# Patient Record
Sex: FEMALE | Race: WHITE | Employment: OTHER | URBAN - METROPOLITAN AREA
[De-identification: names, ages, dates, MRNs, and addresses within clinical notes are randomized per-mention and may not be internally consistent; named-entity substitution may affect disease eponyms.]

---

## 2017-01-23 RX ORDER — AMIODARONE HYDROCHLORIDE 200 MG/1
TABLET ORAL
Qty: 30 TAB | Refills: 6 | Status: SHIPPED | OUTPATIENT
Start: 2017-01-23 | End: 2017-04-04 | Stop reason: SDUPTHER

## 2017-04-04 ENCOUNTER — OFFICE VISIT (OUTPATIENT)
Dept: CARDIOLOGY CLINIC | Age: 82
End: 2017-04-04

## 2017-04-04 VITALS
HEART RATE: 64 BPM | DIASTOLIC BLOOD PRESSURE: 65 MMHG | BODY MASS INDEX: 24.48 KG/M2 | SYSTOLIC BLOOD PRESSURE: 111 MMHG | HEIGHT: 62 IN | WEIGHT: 133 LBS

## 2017-04-04 DIAGNOSIS — I48.0 PAROXYSMAL ATRIAL FIBRILLATION (HCC): Primary | ICD-10-CM

## 2017-04-04 DIAGNOSIS — E78.5 HYPERLIPIDEMIA, UNSPECIFIED HYPERLIPIDEMIA TYPE: ICD-10-CM

## 2017-04-04 DIAGNOSIS — R06.02 SOB (SHORTNESS OF BREATH) ON EXERTION: ICD-10-CM

## 2017-04-04 DIAGNOSIS — I10 ESSENTIAL HYPERTENSION, BENIGN: ICD-10-CM

## 2017-04-04 DIAGNOSIS — Z79.899 HIGH RISK MEDICATION USE: ICD-10-CM

## 2017-04-04 RX ORDER — DILTIAZEM HYDROCHLORIDE 120 MG/1
120 CAPSULE, COATED, EXTENDED RELEASE ORAL DAILY
Qty: 30 CAP | Refills: 6 | Status: SHIPPED | OUTPATIENT
Start: 2017-04-04 | End: 2017-06-01 | Stop reason: CLARIF

## 2017-04-04 RX ORDER — VALSARTAN AND HYDROCHLOROTHIAZIDE 160; 12.5 MG/1; MG/1
1 TABLET, FILM COATED ORAL DAILY
COMMUNITY
End: 2018-03-08

## 2017-04-04 NOTE — PROGRESS NOTES
1. Have you been to the ER, urgent care clinic since your last visit? Hospitalized since your last visit?     no  2. Have you seen or consulted any other health care providers outside of the [address] [place] [address] since your last visit? Include any pap smears or colon screening. Yes Where: Dr.Lopresti     3. Since your last visit, have you had any of the following symptoms? chest pains and shortness of breath. 4.  Have you had any blood work, X-rays or cardiac testing?       Yes Where: Dr. Jorge Moreno

## 2017-04-04 NOTE — TELEPHONE ENCOUNTER
Patient called to discuss changes to anticoagulants. Pharmacy called to order xarelto. She will  copay cards in the morning prior to going to the pharmacy. She voices understanding and acceptance of this advice and will call back if any further questions or concerns.

## 2017-04-04 NOTE — MR AVS SNAPSHOT
Visit Information Date & Time Provider Department Dept. Phone Encounter #  
 4/4/2017  9:00 AM Anyi Mott MD [facility] [phone] [id number] Follow-up Instructions Return in about 4 weeks (around 5/2/2017). Your Appointments 4/13/2017  9:15 AM  
PROCEDURE with [place] [facility] ([facility]) Appt Note: Echo/[address] [facility] [address]  
  
   
 [address] [address] [address] [address]  
  
    
 5/2/2017  9:45 AM  
Follow Up with Anyi Mott MD  
[facility] ([facility]) Appt Note: 4 week post echo/holter/PFT follow up  
 [address] [facility] [address]  
  
   
 [address] [id number] [address] [address] Upcoming Health Maintenance Date Due DTaP/Tdap/Td series (1 - Tdap) 11/23/1956 ZOSTER VACCINE AGE 60> 11/23/1995 GLAUCOMA SCREENING Q2Y 11/23/2000 OSTEOPOROSIS SCREENING (DEXA) 11/23/2000 Pneumococcal 65+ Low/Medium Risk (1 of 2 - PCV13) 11/23/2000 MEDICARE YEARLY EXAM 11/23/2000 INFLUENZA AGE 9 TO ADULT 8/1/2016 Allergies as of 4/4/2017  Review Complete On: 4/4/2017 By: Anyi Mott MD  
  
 Severity Noted Reaction Type Reactions Lisinopril  02/27/2013    Cough *Antihypertensives * Current Immunizations  Never Reviewed No immunizations on file. Not reviewed this visit You Were Diagnosed With   
  
 Codes Comments Paroxysmal atrial fibrillation (HCC)    -  Primary ICD-10-CM: I48.0 ICD-9-CM: 427.31 recent increase sob 
recurrent a fib 
stop amiodarone 
add eliquis and cardizem Essential hypertension, benign     ICD-10-CM: I10 
ICD-9-CM: 401.1 stable Hyperlipidemia, unspecified hyperlipidemia type     ICD-10-CM: E78.5 ICD-9-CM: 272.4 SOB (shortness of breath) on exertion     ICD-10-CM: R06.02 
ICD-9-CM: 786.05 recently worse 
exertional 
like climbing stairs High risk medication use     ICD-10-CM: Z79.899 ICD-9-CM: V58.69 amiodarone for af  
  
Vitals BP Pulse Height(growth percentile) Weight(growth percentile) BMI OB Status 111/65 64 5' 2\" (1.575 m) 133 lb (60.3 kg) 24.33 kg/m2 Hysterectomy Smoking Status Never Smoker Vitals History BMI and BSA Data Body Mass Index Body Surface Area  
 24.33 kg/m 2 1.62 m 2 Preferred Pharmacy Pharmacy Name Phone [id number] - [place] [address] [phone] Your Updated Medication List  
  
   
This list is accurate as of: 4/4/17  9:40 AM.  Always use your most recent med list.  
  
  
  
  
 ALEVE 220 mg tablet Generic drug:  naproxen sodium Take 220 mg by mouth two (2) times daily (with meals). ALLERGY RELIEF (LORATADINE) 10 mg dissolvable tablet Generic drug:  loratadine Take 10 mg by mouth daily. apixaban 5 mg tablet Commonly known as:  Elta Cheek Take 1 Tab by mouth two (2) times a day. aspirin delayed-release 81 mg tablet Take  by mouth daily. atorvastatin 40 mg tablet Commonly known as:  LIPITOR Take  by mouth daily. dilTIAZem  mg ER capsule Commonly known as:  CARDIZEM CD Take 1 Cap by mouth daily. valsartan-hydroCHLOROthiazide 160-12.5 mg per tablet Commonly known as:  DIOVAN-HCT Take 1 Tab by mouth daily. Prescriptions Sent to Pharmacy Refills  
 apixaban (ELIQUIS) 5 mg tablet 6 Sig: Take 1 Tab by mouth two (2) times a day. Class: Normal  
 Pharmacy: [address] [place] [address] Ph #: [phone] Route: Oral  
 dilTIAZem CD (CARDIZEM CD) 120 mg ER capsule 6 Sig: Take 1 Cap by mouth daily. Class: Normal  
 Pharmacy: [address] [place] [address] Ph #: [phone] Route: Oral  
  
We Performed the Following AMB POC EKG ROUTINE W/ 12 LEADS, INTER & REP [39522 CPT(R)] ECG MONITOR/24 HRS,COMPLETE [id number] CPT(R)] Follow-up Instructions Return in about 4 weeks (around 5/2/2017). To-Do List   
 04/07/2017 Cardiac Services:  2D ECHO COMPLETE ADULT (TTE)   
  
 04/11/2017 PFT:  PFT DLCO Patient Instructions Patient is scheduled to have a PFT at [place] [address] on 4/27/17 @ 9:00. Introducing [facility]! [employer] introduces [employer] patient portal. Now you can access parts of your medical record, email your doctor's office, and request medication refills online. 1. In your internet browser, go to [email or web address] 2. Click on the First Time User? Click Here link in the Sign In box. You will see the New Member Sign Up page. 3. Enter your Peak 10 Access Code exactly as it appears below. You will not need to use this code after youve completed the sign-up process. If you do not sign up before the expiration date, you must request a new code. · Peak [employer] Access Code: [email or web address] Expires: 7/3/2017  8:53 AM 
 
4. Enter the last four digits of your Social Security Number (xxxx) and Date of Birth (mm/dd/yyyy) as indicated and click Submit. You will be taken to the next sign-up page. 5. Create a Peak 10 ID. This will be your Peak 10 login ID and cannot be changed, so think of one that is secure and easy to remember. 6. Create a Peak 10 password. You can change your password at any time. 7. Enter your Password Reset Question and Answer. This can be used at a later time if you forget your password. 8. Enter your e-mail address. You will receive e-mail notification when new information is available in [address] 9. Click Sign Up. You can now view and download portions of your medical record. 10. Click the Download Summary menu link to download a portable copy of your medical information. If you have questions, please visit the Frequently Asked Questions section of the [employer] website. Remember, [employer] is NOT to be used for urgent needs. For medical emergencies, dial 911. Now available from your iPhone and Android! Please provide this summary of care documentation to your next provider. Your primary care clinician is listed as Ole Motley. If you have any questions after today's visit, please call [phone].

## 2017-04-04 NOTE — LETTER
Geovany Massed 
1935 4/4/2017 Dear Victor Manuel Curry MD 
 
I had the pleasure of evaluating  Ms. Filemon Haider in office today. Below are the relevant portions of my assessment and plan of care. ICD-10-CM ICD-9-CM 1. Paroxysmal atrial fibrillation (HCC) I48.0 427.31 2D ECHO COMPLETE ADULT (TTE) PFT DLCO AMB POC EKG ROUTINE W/ 12 LEADS, INTER & REP  
   ECG MONITOR/24 HRS,COMPLETE  
 recent increase sob 
recurrent a fib 
stop amiodarone 
add eliquis and cardizem 2. Essential hypertension, benign I10 401.1   
 stable 3. Hyperlipidemia, unspecified hyperlipidemia type E78.5 272.4 4. SOB (shortness of breath) on exertion R06.02 786.05   
 recently worse 
exertional 
like climbing stairs 5. High risk medication use Z79.899 V58.69 2D ECHO COMPLETE ADULT (TTE) PFT DLCO  
 amiodarone for af  
 
 
Current Outpatient Prescriptions Medication Sig Dispense Refill  valsartan-hydroCHLOROthiazide (DIOVAN-HCT) 160-12.5 mg per tablet Take 1 Tab by mouth daily.  apixaban (ELIQUIS) 5 mg tablet Take 1 Tab by mouth two (2) times a day. 60 Tab 6  
 dilTIAZem CD (CARDIZEM CD) 120 mg ER capsule Take 1 Cap by mouth daily. 30 Cap 6  
 atorvastatin (LIPITOR) 40 mg tablet Take  by mouth daily.  naproxen sodium (ALEVE) 220 mg tablet Take 220 mg by mouth two (2) times daily (with meals).  aspirin delayed-release 81 mg tablet Take  by mouth daily.  loratadine (ALLERGY RELIEF, LORATADINE,) 10 mg dissolvable tablet Take 10 mg by mouth daily. Orders Placed This Encounter  2D ECHO COMPLETE ADULT (TTE) Standing Status:   Future Standing Expiration Date:   10/1/2017 Order Specific Question:   Reason for Exam: Answer:   see diagnosis  ECG MONITOR/24 HRS,COMPLETE Order Specific Question:   Reason for Exam: Answer:   a fib  AMB POC EKG ROUTINE W/ 12 LEADS, INTER & REP Order Specific Question:   Reason for Exam: Answer:   afib  PFT DLCO  
 Standing Status:   Future Standing Expiration Date:   10/2/2017  
 valsartan-hydroCHLOROthiazide (DIOVAN-HCT) 160-12.5 mg per tablet Sig: Take 1 Tab by mouth daily.  apixaban (ELIQUIS) 5 mg tablet Sig: Take 1 Tab by mouth two (2) times a day. Dispense:  60 Tab Refill:  6  
 dilTIAZem CD (CARDIZEM CD) 120 mg ER capsule Sig: Take 1 Cap by mouth daily. Dispense:  30 Cap Refill:  6 If you have questions, please do not hesitate to call me. I look forward to following Ms. Sarah Friend along with you. Sincerely, Junie Fonseca MD

## 2017-04-04 NOTE — PROGRESS NOTES
HISTORY OF PRESENT ILLNESS  Taty Jc is a 80 y.o. female. HPI Comments: Patient with a fib,htn,on amiodarone. On follow up patient denies any chest pains,sob, palpitation or other significant symptoms. Irregular Heart Beat    The history is provided by the patient. This is a chronic problem. The problem has been resolved. The problem occurs rarely. Associated symptoms include shortness of breath. Pertinent negatives include no fever, no chest pain, no claudication, no orthopnea, no PND, no abdominal pain, no nausea, no vomiting, no headaches, no dizziness, no weakness, no cough, no hemoptysis and no sputum production. Shortness of Breath   Pertinent negatives include no fever, no headaches, no cough, no sputum production, no hemoptysis, no wheezing, no PND, no orthopnea, no chest pain, no vomiting, no abdominal pain, no rash, no leg swelling and no claudication. Review of Systems   Constitutional: Negative for chills and fever. HENT: Negative for nosebleeds. Eyes: Negative for blurred vision and double vision. Respiratory: Positive for shortness of breath. Negative for cough, hemoptysis, sputum production and wheezing. Cardiovascular: Positive for palpitations. Negative for chest pain, orthopnea, claudication, leg swelling and PND. Gastrointestinal: Negative for abdominal pain, heartburn, nausea and vomiting. Musculoskeletal: Negative for myalgias. Skin: Negative for rash. Neurological: Negative for dizziness, weakness and headaches. Endo/Heme/Allergies: Does not bruise/bleed easily.      Family History   Problem Relation Age of Onset    Heart Disease Neg Hx      no family history of heart disease       Past Medical History:   Diagnosis Date    Atrial fibrillation [facility])     Assessment: Maintaining S Sheng on PO amiodarone pft followed by dr Josephine Chan. tsh/lft: mildly increased tsh, t4 normal, pf stable, rpt labs pending with pmd    CAD (coronary artery disease)     High cholesterol, a- fib    Chronic airway obstruction, not elsewhere classified 3/21/2013    Chronic lung disease     Essential hypertension, benign     Stable, edema better after receiving hctz    Hypertension     Other and unspecified hyperlipidemia     On Crestor and Fish Oil       Past Surgical History:   Procedure Laterality Date    HX HYSTERECTOMY      HX SPLENECTOMY         Social History   Substance Use Topics    Smoking status: Never Smoker    Smokeless tobacco: Never Used    Alcohol use Yes      Comment: socially       Allergies   Allergen Reactions    Lisinopril Cough     *Antihypertensives *       Outpatient Prescriptions Marked as Taking for the 4/4/17 encounter (Office Visit) with Hardy Bhandari MD   Medication Sig Dispense Refill    valsartan-hydroCHLOROthiazide (DIOVAN-HCT) 160-12.5 mg per tablet Take 1 Tab by mouth daily.  apixaban (ELIQUIS) 5 mg tablet Take 1 Tab by mouth two (2) times a day. 60 Tab 6    dilTIAZem CD (CARDIZEM CD) 120 mg ER capsule Take 1 Cap by mouth daily. 30 Cap 6    atorvastatin (LIPITOR) 40 mg tablet Take  by mouth daily.  naproxen sodium (ALEVE) 220 mg tablet Take 220 mg by mouth two (2) times daily (with meals).  aspirin delayed-release 81 mg tablet Take  by mouth daily.  loratadine (ALLERGY RELIEF, LORATADINE,) 10 mg dissolvable tablet Take 10 mg by mouth daily. Visit Vitals    /65    Pulse 64    Ht 5' 2\" (1.575 m)    Wt 60.3 kg (133 lb)    BMI 24.33 kg/m2       Physical Exam   Constitutional: She is oriented to person, place, and time. She appears well-developed and well-nourished. HENT:   Head: Normocephalic and atraumatic. Eyes: Conjunctivae are normal.   Neck: Neck supple. No JVD present. No tracheal deviation present. No thyromegaly present. Cardiovascular: Normal rate. An irregularly irregular rhythm present. PMI is not displaced. Exam reveals no gallop, no S3 and no decreased pulses.     No murmur heard.  Pulmonary/Chest: No respiratory distress. She has no wheezes. She has no rales. She exhibits no tenderness. Abdominal: Soft. There is no tenderness. Musculoskeletal: She exhibits no edema. Neurological: She is alert and oriented to person, place, and time. Skin: Skin is warm. Psychiatric: She has a normal mood and affect. Ms. Rocio Washburn has a reminder for a \"due or due soon\" health maintenance. I have asked that she contact her primary care provider for follow-up on this health maintenance. I Have personally reviewed recent relevant labs available and discussed with patient  3/2016-tsh -high-repeat free t4  lft-normal  CARDIOLOGY STUDIES 7/1/2012 9/1/2010 10/1/2009 4/1/2009 1/1/2009 12/1/2008   Myocardial Perfusion Scan Result - - - - nl scan, EF 70% -   Echocardiogram - Complete Result - - - - - EF 70%, PAP 30   Cardioversion Result - - - successful - -   PFT's with DLCO Result dlco 73, stable see report - - - -   24 hr Holter Monitor Result - - see report - - -     I have discussed risk benefit and option of use of amiodarone for arrythmia. Risk of toxicity with medication are informed. Patient will require careful monitoring.  ekg  A fib-4/2017    Assessment         ICD-10-CM ICD-9-CM    1. Paroxysmal atrial fibrillation (HCC) I48.0 427.31 2D ECHO COMPLETE ADULT (TTE)      PFT DLCO      AMB POC EKG ROUTINE W/ 12 LEADS, INTER & REP      ECG MONITOR/24 HRS,COMPLETE    recent increase sob  recurrent a fib  stop amiodarone  add eliquis and cardizem   2. Essential hypertension, benign I10 401.1     stable   3. Hyperlipidemia, unspecified hyperlipidemia type E78.5 272.4    4. SOB (shortness of breath) on exertion R06.02 786.05     recently worse  exertional  like climbing stairs   5.  High risk medication use Z79.899 V58.69 2D ECHO COMPLETE ADULT (TTE)      PFT DLCO    amiodarone for af     4/2017-recurrent-amiodarone stopped-has tried multiple other meds in past  Medications Discontinued During This Encounter   Medication Reason    amiodarone (CORDARONE) 214 mg tablet Duplicate Order    tiotropium (SPIRIVA WITH HANDIHALER) 18 mcg inhalation capsule Not A Current Medication    VALSARTAN PO Not A Current Medication    amiodarone (CORDARONE) 200 mg tablet Other       Orders Placed This Encounter    2D ECHO COMPLETE ADULT (TTE)     Standing Status:   Future     Standing Expiration Date:   10/1/2017     Order Specific Question:   Reason for Exam:     Answer:   see diagnosis    ECG MONITOR/24 HRS,COMPLETE     Order Specific Question:   Reason for Exam:     Answer:   a fib    AMB POC EKG ROUTINE W/ 12 LEADS, INTER & REP     Order Specific Question:   Reason for Exam:     Answer:   afib    PFT DLCO     Standing Status:   Future     Standing Expiration Date:   10/2/2017    apixaban (ELIQUIS) 5 mg tablet     Sig: Take 1 Tab by mouth two (2) times a day. Dispense:  60 Tab     Refill:  6    dilTIAZem CD (CARDIZEM CD) 120 mg ER capsule     Sig: Take 1 Cap by mouth daily. Dispense:  30 Cap     Refill:  6       Follow-up Disposition:  Return in about 4 weeks (around 5/2/2017).

## 2017-04-05 ENCOUNTER — TELEPHONE (OUTPATIENT)
Dept: CARDIOLOGY CLINIC | Age: 82
End: 2017-04-05

## 2017-04-05 NOTE — TELEPHONE ENCOUNTER
Patient was just put on Xarelto yesterday. Per Dr Alejo Santana. Should she take the Xarelto and ASA?

## 2017-04-05 NOTE — TELEPHONE ENCOUNTER
Patient was out on Xarelto and patient wanted to know if you still would like for her to take her ASA 81 mg daily along with the Xarelto. Please Advise.

## 2017-04-05 NOTE — TELEPHONE ENCOUNTER
Patient called to discuss discontinuing ASA while on Xarelto. She voices understanding and acceptance of this advice and will call back if any further questions or concerns.

## 2017-04-13 ENCOUNTER — CLINICAL SUPPORT (OUTPATIENT)
Dept: CARDIOLOGY CLINIC | Age: 82
End: 2017-04-13

## 2017-04-13 DIAGNOSIS — Z79.899 HIGH RISK MEDICATION USE: ICD-10-CM

## 2017-04-13 DIAGNOSIS — I48.0 PAROXYSMAL ATRIAL FIBRILLATION (HCC): ICD-10-CM

## 2017-05-02 ENCOUNTER — OFFICE VISIT (OUTPATIENT)
Dept: CARDIOLOGY CLINIC | Age: 82
End: 2017-05-02

## 2017-05-02 VITALS
HEIGHT: 62 IN | DIASTOLIC BLOOD PRESSURE: 53 MMHG | SYSTOLIC BLOOD PRESSURE: 117 MMHG | HEART RATE: 57 BPM | WEIGHT: 129 LBS | BODY MASS INDEX: 23.74 KG/M2

## 2017-05-02 DIAGNOSIS — I48.19 PERSISTENT ATRIAL FIBRILLATION (HCC): Primary | ICD-10-CM

## 2017-05-02 DIAGNOSIS — J98.4 CHRONIC LUNG DISEASE: ICD-10-CM

## 2017-05-02 DIAGNOSIS — I08.0 MITRAL AND AORTIC REGURGITATION: ICD-10-CM

## 2017-05-02 DIAGNOSIS — I10 ESSENTIAL HYPERTENSION, BENIGN: ICD-10-CM

## 2017-05-02 NOTE — PROGRESS NOTES
1. Have you been to the ER, urgent care clinic since your last visit? Hospitalized since your last visit?     no  2. Have you seen or consulted any other health care providers outside of the [facility] since your last visit? Include any pap smears or colon screening. No     3. Since your last visit, have you had any of the following symptoms?  no

## 2017-05-02 NOTE — LETTER
Desire Ink 
1935 5/2/2017 Dear Dary Wolf MD 
 
I had the pleasure of evaluating  Ms. Sharon Kidd in office today. Below are the relevant portions of my assessment and plan of care. ICD-10-CM ICD-9-CM 1. Persistent atrial fibrillation (HCC) I48.1 427.31   
 off amiodarone 
will continue anticoag 2. Essential hypertension, benign I10 401.1   
 stable 3. Mitral and aortic regurgitation I08.0 396.3   
 mild asymptomatic 4. Chronic lung disease J98.4 518.89   
 abn pft 
reported stable from 2012 Current Outpatient Prescriptions Medication Sig Dispense Refill  rivaroxaban (XARELTO) 20 mg tab tablet Take 1 Tab by mouth daily. 30 Tab 5  
 valsartan-hydroCHLOROthiazide (DIOVAN-HCT) 160-12.5 mg per tablet Take 1 Tab by mouth daily.  dilTIAZem CD (CARDIZEM CD) 120 mg ER capsule Take 1 Cap by mouth daily. 30 Cap 6  
 atorvastatin (LIPITOR) 40 mg tablet Take  by mouth daily.  naproxen sodium (ALEVE) 220 mg tablet Take 220 mg by mouth two (2) times daily (with meals).  loratadine (ALLERGY RELIEF, LORATADINE,) 10 mg dissolvable tablet Take 10 mg by mouth daily. No orders of the defined types were placed in this encounter. If you have questions, please do not hesitate to call me. I look forward to following Ms. Sharon Kidd along with you. Sincerely, Catalino Castellanos MD

## 2017-05-02 NOTE — MR AVS SNAPSHOT
Visit Information Date & Time Provider Department Dept. Phone Encounter #  
 5/2/2017  9:45 AM Anyi Mott MD [phone] Follow-up Instructions Return in about 6 months (around 11/2/2017). Upcoming Health Maintenance Date Due DTaP/Tdap/Td series (1 - Tdap) 11/23/1956 ZOSTER VACCINE AGE 60> 11/23/1995 GLAUCOMA SCREENING Q2Y 11/23/2000 OSTEOPOROSIS SCREENING (DEXA) 11/23/2000 Pneumococcal 65+ Low/Medium Risk (1 of 2 - PCV13) 11/23/2000 MEDICARE YEARLY EXAM 11/23/2000 INFLUENZA AGE 9 TO ADULT 8/1/2017 Allergies as of 5/2/2017  Review Complete On: 5/2/2017 By: Anyi Mott MD  
  
 Severity Noted Reaction Type Reactions Lisinopril  02/27/2013    Cough *Antihypertensives * Current Immunizations  Never Reviewed No immunizations on file. Not reviewed this visit You Were Diagnosed With   
  
 Codes Comments Persistent atrial fibrillation (HCC)    -  Primary ICD-10-CM: I48.1 ICD-9-CM: 427.31 off amiodarone 
will continue anticoag Essential hypertension, benign     ICD-10-CM: I10 
ICD-9-CM: 401.1 stable Mitral and aortic regurgitation     ICD-10-CM: I08.0 ICD-9-CM: 396.3 mild asymptomatic Chronic lung disease     ICD-10-CM: J98.4 ICD-9-CM: 518.89 abn pft 
reported stable from 2012 Vitals BP Pulse Height(growth percentile) Weight(growth percentile) BMI OB Status 117/53 (!) 57 5' 2\" (1.575 m) 129 lb (58.5 kg) 23.59 kg/m2 Hysterectomy Smoking Status Never Smoker Vitals History BMI and BSA Data Body Mass Index Body Surface Area  
 23.59 kg/m 2 1.6 m 2 Preferred Pharmacy Pharmacy Name Phone [id number] - [place] [address] [phone] Your Updated Medication List  
  
   
This list is accurate as of: 5/2/17 10:04 AM.  Always use your most recent med list.  
  
  
  
  
 ALEVE 220 mg tablet Generic drug:  naproxen sodium Take 220 mg by mouth two (2) times daily (with meals). ALLERGY RELIEF (LORATADINE) 10 mg dissolvable tablet Generic drug:  loratadine Take 10 mg by mouth daily. atorvastatin 40 mg tablet Commonly known as:  LIPITOR Take  by mouth daily. dilTIAZem  mg ER capsule Commonly known as:  CARDIZEM CD Take 1 Cap by mouth daily. rivaroxaban 20 mg Tab tablet Commonly known as:  Lendel Hurst Take 1 Tab by mouth daily. valsartan-hydroCHLOROthiazide 160-12.5 mg per tablet Commonly known as:  DIOVAN-HCT Take 1 Tab by mouth daily. Follow-up Instructions Return in about 6 months (around 11/2/2017). Introducing [address]! [facility] introduces [employer] patient portal. Now you can access parts of your medical record, email your doctor's office, and request medication refills online. 1. In your internet browser, go to [email or web address] 2. Click on the First Time User? Click Here link in the Sign In box. You will see the New Member Sign Up page. 3. Enter your [employer] Access Code exactly as it appears below. You will not need to use this code after youve completed the sign-up process. If you do not sign up before the expiration date, you must request a new code. · [employer] Access Code: [email or web address] Expires: 7/3/2017  8:53 AM 
 
4. Enter the last four digits of your Social Security Number (xxxx) and Date of Birth (mm/dd/yyyy) as indicated and click Submit. You will be taken to the next sign-up page. 5. Create a [employer] ID. This will be your [employer] login ID and cannot be changed, so think of one that is secure and easy to remember. 6. Create a [employer] password. You can change your password at any time. 7. Enter your Password Reset Question and Answer. This can be used at a later time if you forget your password. 8. Enter your e-mail address. You will receive e-mail notification when new information is available in [address] 9. Click Sign Up. You can now view and download portions of your medical record. 10. Click the Download Summary menu link to download a portable copy of your medical information. If you have questions, please visit the Frequently Asked Questions section of the [employer] website. Remember, [employer] is NOT to be used for urgent needs. For medical emergencies, dial 911. Now available from your iPhone and Android! Please provide this summary of care documentation to your next provider. Your primary care clinician is listed as Mak Stafford. If you have any questions after today's visit, please call [phone].

## 2017-05-02 NOTE — PROGRESS NOTES
HISTORY OF PRESENT ILLNESS  Geovany Perez is a [age] [address] y.o. female. HPI Comments: Patient with a fib,htn,on amiodarone. On follow up patient denies any chest pains,sob, palpitation or other significant symptoms. Patient is here for follow up of diagnostic tests. Results will be discussed. Palpitations    The history is provided by the patient. This is a chronic problem. The problem has been resolved. The problem occurs rarely. Associated symptoms include shortness of breath. Pertinent negatives include no fever, no chest pain, no claudication, no orthopnea, no PND, no abdominal pain, no nausea, no vomiting, no headaches, no dizziness, no weakness, no cough, no hemoptysis and no sputum production. Shortness of Breath   Pertinent negatives include no fever, no headaches, no cough, no sputum production, no hemoptysis, no wheezing, no PND, no orthopnea, no chest pain, no vomiting, no abdominal pain, no rash, no leg swelling and no claudication. Review of Systems   Constitutional: Negative for chills and fever. HENT: Negative for nosebleeds. Eyes: Negative for blurred vision and double vision. Respiratory: Positive for shortness of breath. Negative for cough, hemoptysis, sputum production and wheezing. Cardiovascular: Positive for palpitations. Negative for chest pain, orthopnea, claudication, leg swelling and PND. Gastrointestinal: Negative for abdominal pain, heartburn, nausea and vomiting. Musculoskeletal: Negative for myalgias. Skin: Negative for rash. Neurological: Negative for dizziness, weakness and headaches. Endo/Heme/Allergies: Does not bruise/bleed easily.      Family History   Problem Relation Age of Onset    Heart Disease Neg Hx      no family history of heart disease       Past Medical History:   Diagnosis Date    Atrial fibrillation [facility])     Assessment: Maintaining S Sheng on PO amiodarone pft followed by dr Vy Gabriel. tsh/lft: mildly increased tsh, t4 normal, pf stable, rpt labs pending with pmd    CAD (coronary artery disease)     High cholesterol, a- fib    Chronic airway obstruction, not elsewhere classified 3/21/2013    Chronic lung disease     Essential hypertension, benign     Stable, edema better after receiving hctz    Hypertension     Other and unspecified hyperlipidemia     On Crestor and Fish Oil       Past Surgical History:   Procedure Laterality Date    HX HYSTERECTOMY      HX SPLENECTOMY         Social History   Substance Use Topics    Smoking status: Never Smoker    Smokeless tobacco: Never Used    Alcohol use Yes      Comment: socially       Allergies   Allergen Reactions    Lisinopril Cough     *Antihypertensives *       Outpatient Prescriptions Marked as Taking for the 5/2/17 encounter (Office Visit) with Von Ingram MD   Medication Sig Dispense Refill    rivaroxaban (XARELTO) 20 mg tab tablet Take 1 Tab by mouth daily. 30 Tab 5    valsartan-hydroCHLOROthiazide (DIOVAN-HCT) 160-12.5 mg per tablet Take 1 Tab by mouth daily.  dilTIAZem CD (CARDIZEM CD) 120 mg ER capsule Take 1 Cap by mouth daily. 30 Cap 6    atorvastatin (LIPITOR) 40 mg tablet Take  by mouth daily.  naproxen sodium (ALEVE) 220 mg tablet Take 220 mg by mouth two (2) times daily (with meals).  loratadine (ALLERGY RELIEF, LORATADINE,) 10 mg dissolvable tablet Take 10 mg by mouth daily. Visit Vitals    /53    Pulse (!) 57    Ht 5' 2\" (1.575 m)    Wt 58.5 kg (129 lb)    BMI 23.59 kg/m2       Physical Exam   Constitutional: She is oriented to person, place, and time. She appears well-developed and well-nourished. HENT:   Head: Normocephalic and atraumatic. Eyes: Conjunctivae are normal.   Neck: Neck supple. No JVD present. No tracheal deviation present. No thyromegaly present. Cardiovascular: Normal rate. An irregularly irregular rhythm present. PMI is not displaced. Exam reveals no gallop, no S3 and no decreased pulses.     No murmur heard.  Pulmonary/Chest: No respiratory distress. She has no wheezes. She has no rales. She exhibits no tenderness. Abdominal: Soft. There is no tenderness. Musculoskeletal: She exhibits no edema. Neurological: She is alert and oriented to person, place, and time. Skin: Skin is warm. Psychiatric: She has a normal mood and affect. Ms. Lynnette Pastrana has a reminder for a \"due or due soon\" health maintenance. I have asked that she contact her primary care provider for follow-up on this health maintenance. I Have personally reviewed recent relevant labs available and discussed with patient  3/2016-tsh -high-repeat free t4  lft-normal  CARDIOLOGY STUDIES 7/1/2012 9/1/2010 10/1/2009 4/1/2009 1/1/2009 12/1/2008   Myocardial Perfusion Scan Result - - - - nl scan, EF 70% -   Echocardiogram - Complete Result - - - - - EF 70%, PAP 30   Cardioversion Result - - - successful - -   PFT's with DLCO Result dlco 73, stable see report - - - -   24 hr Holter Monitor Result - - see report - - -     I have discussed risk benefit and option of use of amiodarone for arrythmia. Risk of toxicity with medication are informed. Patient will require careful monitoring.  ekg  A fib-4/2017  SUMMARY:4/2017  Left ventricle: Systolic function was normal. Ejection fraction was  estimated to be 55 %. There were no regional wall motion abnormalities. Features were consistent with a pseudonormal left ventricular filling  pattern, with concomitant abnormal relaxation and increased filling  pressure (grade 2 diastolic dysfunction). Left atrium: The atrium was mildly dilated. Right atrium: The atrium was dilated. Mitral valve: There was mild annular calcification. There was mild  regurgitation. Aortic valve: There was mild regurgitation. 4/2017  holter-a fib    4/2017  pft-minimal obstructive and mild diffusion defect-no change 2012  Assessment         ICD-10-CM ICD-9-CM    1.  Persistent atrial fibrillation (HCC) I48.1 427.31     off amiodarone  will continue anticoag   2. Essential hypertension, benign I10 401.1     stable     3. Mitral and aortic regurgitation I08.0 396.3     mild asymptomatic   4. Chronic lung disease J98.4 518.89     abn pft  reported stable from 2012 4/2017-recurrent-amiodarone stopped-has tried multiple other meds in past  5/2017-discussed options of ablation -wants to continue rate control startegy  There are no discontinued medications. No orders of the defined types were placed in this encounter. Follow-up Disposition:  Return in about 6 months (around 11/2/2017).

## 2017-05-09 DIAGNOSIS — I48.0 PAROXYSMAL ATRIAL FIBRILLATION (HCC): ICD-10-CM

## 2017-05-09 DIAGNOSIS — Z79.899 HIGH RISK MEDICATION USE: ICD-10-CM

## 2017-06-01 DIAGNOSIS — I10 HYPERTENSION, ESSENTIAL: Primary | ICD-10-CM

## 2017-06-01 RX ORDER — AMLODIPINE BESYLATE 5 MG/1
5 TABLET ORAL DAILY
Qty: 30 TAB | Refills: 3 | Status: SHIPPED | OUTPATIENT
Start: 2017-06-01 | End: 2017-09-23 | Stop reason: SDUPTHER

## 2017-06-01 NOTE — TELEPHONE ENCOUNTER
Patient called to discuss medication changes d/t insurance recommendations. Patient will begin taking norvasc 5mg and discontinue taking cartia. She will wait for the tier exception results before making any decision concerning xarelto. She voices understanding and acceptance of this advice and will call back if any further questions or concerns.

## 2017-06-01 NOTE — TELEPHONE ENCOUNTER
As per patients request and [employer] recommendation stop diltiazem and start norvasc 5 mg a day  When ready chage xarelto to coumadin  Cost is controlled by [employer] so we can not change that

## 2017-06-01 NOTE — TELEPHONE ENCOUNTER
Patient calling to discuss Georgia Maryann that is causing dizziness. She has called [facility] and they have recommended Norvasc or Felodipine. She stated that she wanted one of these medications prescribed instead of the cartia. She was asked if she had taken Bp or HR daily since Bp meds had been prescribed, her answer was no, that she did not have a meter, and was not interested in recording her Bp, she just wanted her medication changed. She also had a second complaint. Her xarelto was $47.00 copay, and she had also asked [facility] to intervene, but wanted another alternative. She was informed that coumadin would be the more cost effective alternative and she stated that she would think about it for a while after testing and change in diet was discussed. Please advise. Thank You.

## 2017-08-11 ENCOUNTER — OFFICE VISIT (OUTPATIENT)
Dept: CARDIOLOGY CLINIC | Age: 82
End: 2017-08-11

## 2017-08-11 VITALS
BODY MASS INDEX: 23.92 KG/M2 | DIASTOLIC BLOOD PRESSURE: 69 MMHG | WEIGHT: 130 LBS | HEART RATE: 70 BPM | HEIGHT: 62 IN | SYSTOLIC BLOOD PRESSURE: 130 MMHG

## 2017-08-11 DIAGNOSIS — I10 ESSENTIAL HYPERTENSION, BENIGN: ICD-10-CM

## 2017-08-11 DIAGNOSIS — I48.19 PERSISTENT ATRIAL FIBRILLATION (HCC): ICD-10-CM

## 2017-08-11 DIAGNOSIS — I50.31 ACUTE DIASTOLIC CONGESTIVE HEART FAILURE (HCC): Primary | ICD-10-CM

## 2017-08-11 DIAGNOSIS — E78.5 HYPERLIPIDEMIA, UNSPECIFIED HYPERLIPIDEMIA TYPE: ICD-10-CM

## 2017-08-11 DIAGNOSIS — I08.0 MITRAL AND AORTIC REGURGITATION: ICD-10-CM

## 2017-08-11 RX ORDER — FUROSEMIDE 20 MG/1
TABLET ORAL AS NEEDED
COMMUNITY
End: 2018-02-19 | Stop reason: SDUPTHER

## 2017-08-11 NOTE — PROGRESS NOTES
HISTORY OF PRESENT ILLNESS  Angela Taveras is a 80 y.o. female. HPI Comments: Patient with a fib,htn,on amiodarone. On follow up patient denies any chest pains,sob, palpitation or other significant symptoms. Patient is here for follow up of diagnostic tests. Results will be discussed. Leg Swelling   The history is provided by the patient. This is a new problem. The current episode started more than 1 week ago (4-5/17). The problem occurs daily. The problem has been gradually improving (since got diuretics from urgent care). Associated symptoms include shortness of breath. Pertinent negatives include no chest pain, no abdominal pain and no headaches. The symptoms are aggravated by standing. The symptoms are relieved by medications and sleep. Shortness of Breath   The history is provided by the patient. This is a new problem. The problem occurs intermittently. The current episode started more than 1 week ago. The problem has been resolved. Pertinent negatives include no fever, no headaches, no cough, no sputum production, no hemoptysis, no wheezing, no PND, no orthopnea, no chest pain, no vomiting, no abdominal pain, no rash, no leg swelling and no claudication. The problem's precipitants include exercise (steps). Hospital Follow Up   The history is provided by the patient (post urgent care for edema). Associated symptoms include shortness of breath. Pertinent negatives include no chest pain, no abdominal pain and no headaches. Palpitations    The history is provided by the patient. This is a chronic (AFib) problem. The problem occurs rarely. Associated symptoms include lower extremity edema and shortness of breath. Pertinent negatives include no fever, no chest pain, no claudication, no orthopnea, no PND, no abdominal pain, no nausea, no vomiting, no headaches, no dizziness, no weakness, no cough, no hemoptysis and no sputum production.        Review of Systems   Constitutional: Negative for chills and fever.   HENT: Negative for nosebleeds. Eyes: Negative for blurred vision and double vision. Respiratory: Positive for shortness of breath. Negative for cough, hemoptysis, sputum production and wheezing. Cardiovascular: Positive for palpitations. Negative for chest pain, orthopnea, claudication, leg swelling and PND. Gastrointestinal: Negative for abdominal pain, heartburn, nausea and vomiting. Musculoskeletal: Negative for myalgias. Skin: Negative for rash. Neurological: Negative for dizziness, weakness and headaches. Endo/Heme/Allergies: Does not bruise/bleed easily. Family History   Problem Relation Age of Onset    Heart Disease Neg Hx      no family history of heart disease       Past Medical History:   Diagnosis Date    Atrial fibrillation ([facility] [address]     Assessment: Maintaining S Sheng on PO amiodarone pft followed by dr Maame Fraga. tsh/lft: mildly increased tsh, t4 normal, pf stable, rpt labs pending with pmd    CAD (coronary artery disease)     High cholesterol, a- fib    Chronic airway obstruction, not elsewhere classified 3/21/2013    Chronic lung disease     Essential hypertension, benign     Stable, edema better after receiving hctz    Hypertension     Other and unspecified hyperlipidemia     On Crestor and Fish Oil       Past Surgical History:   Procedure Laterality Date    HX HYSTERECTOMY      HX SPLENECTOMY         Social History   Substance Use Topics    Smoking status: Never Smoker    Smokeless tobacco: Never Used    Alcohol use Yes      Comment: socially       Allergies   Allergen Reactions    Lisinopril Cough     *Antihypertensives *       Outpatient Prescriptions Marked as Taking for the 8/11/17 encounter (Office Visit) with Andrew Gunderson MD   Medication Sig Dispense Refill    furosemide (LASIX) 20 mg tablet Take  by mouth daily.  amLODIPine (NORVASC) 5 mg tablet Take 1 Tab by mouth daily.  30 Tab 3    rivaroxaban (XARELTO) 20 mg tab tablet Take 1 Tab by mouth daily. 30 Tab 5    valsartan-hydroCHLOROthiazide (DIOVAN-HCT) 160-12.5 mg per tablet Take 1 Tab by mouth daily.  naproxen sodium (ALEVE) 220 mg tablet Take 220 mg by mouth as needed.  loratadine (ALLERGY RELIEF, LORATADINE,) 10 mg dissolvable tablet Take 10 mg by mouth daily. Visit Vitals    /69    Pulse 70    Ht 5' 2\" (1.575 m)    Wt 59 kg (130 lb)    BMI 23.78 kg/m2       Physical Exam   Constitutional: She is oriented to person, place, and time. She appears well-developed and well-nourished. HENT:   Head: Normocephalic and atraumatic. Eyes: Conjunctivae are normal.   Neck: Neck supple. No JVD present. No tracheal deviation present. No thyromegaly present. Cardiovascular: Normal rate. An irregularly irregular rhythm present. PMI is not displaced. Exam reveals no gallop, no S3 and no decreased pulses. No murmur heard. Pulmonary/Chest: No respiratory distress. She has no wheezes. She has no rales. She exhibits no tenderness. Abdominal: Soft. There is no tenderness. Musculoskeletal: She exhibits no edema. Neurological: She is alert and oriented to person, place, and time. Skin: Skin is warm. Psychiatric: She has a normal mood and affect. Ms. Yessi Day has a reminder for a \"due or due soon\" health maintenance. I have asked that she contact her primary care provider for follow-up on this health maintenance.   I Have personally reviewed recent relevant labs available and discussed with patient  3/2016-tsh -high-repeat free t4  lft-normal  CARDIOLOGY STUDIES 7/1/2012 9/1/2010 10/1/2009 4/1/2009 1/1/2009 12/1/2008   Myocardial Perfusion Scan Result - - - - nl scan, EF 70% -   Echocardiogram - Complete Result - - - - - EF 70%, PAP 30   Cardioversion Result - - - successful - -   PFT's with DLCO Result dlco 73, stable see report - - - -   24 hr Holter Monitor Result - - see report - - -   Some recent data might be hidden     I have discussed risk benefit and option of use of amiodarone for arrythmia. Risk of toxicity with medication are informed. Patient will require careful monitoring.  ekg  A fib-4/2017  SUMMARY:4/2017  Left ventricle: Systolic function was normal. Ejection fraction was  estimated to be 55 %. There were no regional wall motion abnormalities. Features were consistent with a pseudonormal left ventricular filling  pattern, with concomitant abnormal relaxation and increased filling  pressure (grade 2 diastolic dysfunction). Left atrium: The atrium was mildly dilated. Right atrium: The atrium was dilated. Mitral valve: There was mild annular calcification. There was mild  regurgitation. Aortic valve: There was mild regurgitation. 4/2017  holter-a fib    4/2017  pft-minimal obstructive and mild diffusion defect-no change 2012  Assessment         4/2017-recurrent-amiodarone stopped-has tried multiple other meds in past  5/2017-discussed options of ablation -wants to continue rate control startegy        Diagnoses and all orders for this visit:    1. Acute diastolic congestive heart failure ([address]  Comments:  8/17 improving with lasix- adv to take prn   Orders:  -     METABOLIC PANEL, BASIC; Future  -     HEPATIC FUNCTION PANEL; Future    2. Essential hypertension, benign  Comments:  Controlled    3. Mitral and aortic regurgitation  Comments:  mild asymptomatic      4. Persistent atrial fibrillation ([address]  Comments:  8/17 rate control and xarelto    5. Hyperlipidemia, unspecified hyperlipidemia type  Comments:  8/17 d/c lipitor on her own; pending lipids  f/u Dr Espinoza Mayorga next visit  Orders:  -     LIPID PANEL; Future        Pertinent laboratory and test data reviewed and discussed with patient.   See patient instructions also for other medical advice given    Medications Discontinued During This Encounter   Medication Reason    atorvastatin (LIPITOR) 40 mg tablet Not A Current Medication       Follow-up Disposition:  Return if symptoms worsen or fail to improve, for post test, as scheduled, with Dr Edgar Harris.

## 2017-08-11 NOTE — PROGRESS NOTES
1. Have you been to the ER, urgent care clinic since your last visit? Hospitalized since your last visit? Yes Where: [facility] feet swelling     2. Have you seen or consulted any other health care providers outside of the [address] [place] [address] since your last visit? Include any pap smears or colon screening. No     3. Since your last visit, have you had any of the following symptoms?      swelling in legs/arms. 4.  Have you had any blood work, X-rays or cardiac testing? No            5.  Where do you normally have your labs drawn? PCP office    6. Do you need any refills today?    No

## 2017-08-11 NOTE — MR AVS SNAPSHOT
Visit Information Date & Time Provider Department Dept. Phone Encounter #  
 8/11/2017 10:15 AM Gustavo Jacobson MD [facility] [phone] Follow-up Instructions Return if symptoms worsen or fail to improve, for post test, as scheduled, with Dr Miladys Lyons. Your Appointments 11/6/2017  8:45 AM  
Follow Up with Santiago Alvarez MD  
[facility] Appt Note: 6 month follow up  
 [address] [facility] [address]  
  
   
 [address] [phone] [place] [address] Upcoming Health Maintenance Date Due DTaP/Tdap/Td series (1 - Tdap) 11/23/1956 ZOSTER VACCINE AGE 60> 9/23/1995 GLAUCOMA SCREENING Q2Y 11/23/2000 OSTEOPOROSIS SCREENING (DEXA) 11/23/2000 Pneumococcal 65+ Low/Medium Risk (1 of 2 - PCV13) 11/23/2000 MEDICARE YEARLY EXAM 11/23/2000 INFLUENZA AGE 9 TO ADULT 8/1/2017 Allergies as of 8/11/2017  Review Complete On: 8/11/2017 By: Gustavo Jacobson MD  
  
 Severity Noted Reaction Type Reactions Lisinopril  02/27/2013    Cough *Antihypertensives * Current Immunizations  Never Reviewed No immunizations on file. Not reviewed this visit You Were Diagnosed With   
  
 Codes Comments Acute diastolic congestive heart failure ([facility] [address]    -  Primary ICD-10-CM: I50.31 ICD-9-CM: 428.31, 428.0 8/17 improving with lasix- adv to take prn   
 Essential hypertension, benign     ICD-10-CM: I10 
ICD-9-CM: 401.1 Controlled Mitral and aortic regurgitation     ICD-10-CM: I08.0 ICD-9-CM: 396.3 mild asymptomatic Persistent atrial fibrillation (HCC)     ICD-10-CM: I48.1 ICD-9-CM: 427.31 8/17 rate control and xarelto Hyperlipidemia, unspecified hyperlipidemia type     ICD-10-CM: E78.5 ICD-9-CM: 272.4 8/17 d/c lipitor on her own; pending lipids f/u Dr Miladys Lyons next visit Vitals  BP Pulse Height(growth percentile) Weight(growth percentile) BMI OB Status 130/69 70 5' 2\" (1.575 m) 130 lb (59 kg) 23.78 kg/m2 Hysterectomy Smoking Status Never Smoker Vitals History BMI and BSA Data Body Mass Index Body Surface Area 23.78 kg/m 2 1.61 m 2 Preferred Pharmacy Pharmacy Name Phone [id number] - [place] [address] [address] [address] [phone] Your Updated Medication List  
  
   
This list is accurate as of: 8/11/17 11:33 AM.  Always use your most recent med list.  
  
  
  
  
 ALEVE 220 mg tablet Generic drug:  naproxen sodium Take 220 mg by mouth as needed. ALLERGY RELIEF (LORATADINE) 10 mg dissolvable tablet Generic drug:  loratadine Take 10 mg by mouth daily. amLODIPine 5 mg tablet Commonly known as:  Zavala Shield Take 1 Tab by mouth daily. furosemide 20 mg tablet Commonly known as:  LASIX Take  by mouth daily. rivaroxaban 20 mg Tab tablet Commonly known as:  Stephanie Span Take 1 Tab by mouth daily. valsartan-hydroCHLOROthiazide 160-12.5 mg per tablet Commonly known as:  DIOVAN-HCT Take 1 Tab by mouth daily. Follow-up Instructions Return if symptoms worsen or fail to improve, for post test, as scheduled, with Dr Miladys Lyons. To-Do List   
 Around 09/05/2017 Lab:  HEPATIC FUNCTION PANEL Around 09/05/2017 Lab:  LIPID PANEL Around 09/05/2017 Lab:  METABOLIC PANEL, BASIC Patient Instructions Medications Discontinued During This Encounter Medication Reason  atorvastatin (LIPITOR) 40 mg tablet Not A Current Medication Orders Placed This Encounter  METABOLIC PANEL, BASIC Standing Status:   Future Standing Expiration Date:   11/11/2017  LIPID PANEL Standing Status:   Future Standing Expiration Date:   11/11/2017  
 HEPATIC FUNCTION PANEL Standing Status:   Future Standing Expiration Date:   11/11/2017 Avoiding Triggers With Heart Failure: Care Instructions Your Care Instructions Triggers are anything that make your heart failure flare up. A flare-up is also called \"sudden heart failure\" or \"acute heart failure. \" When you have a flare-up, fluid builds up in your lungs, and you have problems breathing. You might need to go to the hospital. By watching for changes in your condition and avoiding triggers, you can prevent heart failure flare-ups. Follow-up care is a key part of your treatment and safety. Be sure to make and go to all appointments, and call your doctor if you are having problems. It's also a good idea to know your test results and keep a list of the medicines you take. How can you care for yourself at home? Watch for changes in your weight and condition · Weigh yourself without clothing at the same time each day. Record your weight. Call your doctor if you have sudden weight gain, such as more than 2 to 3 pounds in a day or 5 pounds in a week. (Your doctor may suggest a different range of weight gain.) A sudden weight gain may mean that your heart failure is getting worse. · Keep a daily record of your symptoms. Write down any changes in how you feel, such as new shortness of breath, cough, or problems eating. Also record if your ankles are more swollen than usual and if you feel more tired than usual. Note anything that you ate or did that could have triggered these changes. Limit sodium Sodium causes your body to hold on to extra water. This may cause your heart failure symptoms to get worse. People get most of their sodium from processed foods. Fast food and restaurant meals also tend to be very high in sodium. · Your doctor may suggest that you limit sodium to 2,000 milligrams (mg) a day or less. That is less than 1 teaspoon of salt a day, including all the salt you eat in cooking or in packaged foods. · Read food labels on cans and food packages.  They tell you how much sodium you get in one serving. Check the serving size. If you eat more than one serving, you are getting more sodium. · Be aware that sodium can come in forms other than salt, including monosodium glutamate (MSG), sodium citrate, and sodium bicarbonate (baking soda). MSG is often added to Asian food. You can sometimes ask for food without MSG or salt. · Slowly reducing salt will help you adjust to the taste. Take the salt shaker off the table. · Flavor your food with garlic, lemon juice, onion, vinegar, herbs, and spices instead of salt. Do not use soy sauce, steak sauce, onion salt, garlic salt, mustard, or ketchup on your food, unless it is labeled \"low-sodium\" or \"low-salt. \" 
· Make your own salad dressings, sauces, and ketchup without adding salt. · Use fresh or frozen ingredients, instead of canned ones, whenever you can. Choose low-sodium canned goods. · Eat less processed food and food from restaurants, including fast food. Exercise as directed Moderate, regular exercise is very good for your heart. It improves your blood flow and helps control your weight. But too much exercise can stress your heart and cause a heart failure flare-up. · Check with your doctor before you start an exercise program. 
· Walking is an easy way to get exercise. Start out slowly. Gradually increase the length and pace of your walk. Swimming, riding a bike, and using a treadmill are also good forms of exercise. · When you exercise, watch for signs that your heart is working too hard. You are pushing yourself too hard if you cannot talk while you are exercising. If you become short of breath or dizzy or have chest pain, stop, sit down, and rest. 
· Do not exercise when you do not feel well. Take medicines correctly · Take your medicines exactly as prescribed. Call your doctor if you think you are having a problem with your medicine. · Make a list of all the medicines you take.  Include those prescribed to you by other doctors and any over-the-counter medicines, vitamins, or supplements you take. Take this list with you when you go to any doctor. · Take your medicines at the same time every day. It may help you to post a list of all the medicines you take every day and what time of day you take them. · Make taking your medicine as simple as you can. Plan times to take your medicines when you are doing other things, such as eating a meal or getting ready for bed. This will make it easier to remember to take your medicines. · Get organized. Use helpful tools, such as daily or weekly pill containers. When should you call for help? Call 911 if you have symptoms of sudden heart failure such as: 
· You have severe trouble breathing. · You cough up pink, foamy mucus. · You have a new irregular or rapid heartbeat. Call your doctor now or seek immediate medical care if: 
· You have new or increased shortness of breath. · You are dizzy or lightheaded, or you feel like you may faint. · You have sudden weight gain, such as more than 2 to 3 pounds in a day or 5 pounds in a week. (Your doctor may suggest a different range of weight gain.) · You have increased swelling in your legs, ankles, or feet. · You are suddenly so tired or weak that you cannot do your usual activities. Watch closely for changes in your health, and be sure to contact your doctor if you develop new symptoms. Where can you learn more? Go to http://mateus-viv.info/. Enter I369 in the search box to learn more about \"Avoiding Triggers With Heart Failure: Care Instructions. \" Current as of: February 23, 2017 Content Version: 11.3 © 9498-5940 Pin[employer]. Care instructions adapted under license by [employer] (which disclaims liability or warranty for this information).  If you have questions about a medical condition or this instruction, always ask your healthcare professional. [employer] disclaims any warranty or liability for your use of this information. Introducing [facility] & HEALTH SERVICES! Caridad Issa introduces [facility] patient portal. Now you can access parts of your medical record, email your doctor's office, and request medication refills online. 1. In your internet browser, go to [email or web address] 2. Click on the First Time User? Click Here link in the Sign In box. You will see the New Member Sign Up page. 3. Enter your [facility] Access Code exactly as it appears below. You will not need to use this code after youve completed the sign-up process. If you do not sign up before the expiration date, you must request a new code. · [employer] [place] Access Code: [email or web address] Expires: 11/9/2017 10:29 AM 
 
4. Enter the last four digits of your Social Security Number (xxxx) and Date of Birth (mm/dd/yyyy) as indicated and click Submit. You will be taken to the next sign-up page. 5. Create a [employer] [place] ID. This will be your [facility] login ID and cannot be changed, so think of one that is secure and easy to remember. 6. Create a [employer] [place] password. You can change your password at any time. 7. Enter your Password Reset Question and Answer. This can be used at a later time if you forget your password. 8. Enter your e-mail address. You will receive e-mail notification when new information is available in [address] 9. Click Sign Up. You can now view and download portions of your medical record. 10. Click the Download Summary menu link to download a portable copy of your medical information. If you have questions, please visit the Frequently Asked Questions section of the [employer] website. Remember, [facility] is NOT to be used for urgent needs. For medical emergencies, dial 911. Now available from your iPhone and Android! Please provide this summary of care documentation to your next provider. Your primary care clinician is listed as Aniya Armstrong. If you have any questions after today's visit, please call [phone].

## 2017-08-11 NOTE — PATIENT INSTRUCTIONS
Medications Discontinued During This Encounter   Medication Reason    atorvastatin (LIPITOR) 40 mg tablet Not A Current Medication       Orders Placed This Encounter    METABOLIC PANEL, BASIC     Standing Status:   Future     Standing Expiration Date:   11/11/2017    LIPID PANEL     Standing Status:   Future     Standing Expiration Date:   11/11/2017    HEPATIC FUNCTION PANEL     Standing Status:   Future     Standing Expiration Date:   11/11/2017          Avoiding Triggers With Heart Failure: Care Instructions  Your Care Instructions  Triggers are anything that make your heart failure flare up. A flare-up is also called \"sudden heart failure\" or \"acute heart failure. \" When you have a flare-up, fluid builds up in your lungs, and you have problems breathing. You might need to go to the hospital. By watching for changes in your condition and avoiding triggers, you can prevent heart failure flare-ups. Follow-up care is a key part of your treatment and safety. Be sure to make and go to all appointments, and call your doctor if you are having problems. It's also a good idea to know your test results and keep a list of the medicines you take. How can you care for yourself at home? Watch for changes in your weight and condition  · Weigh yourself without clothing at the same time each day. Record your weight. Call your doctor if you have sudden weight gain, such as more than 2 to 3 pounds in a day or 5 pounds in a week. (Your doctor may suggest a different range of weight gain.) A sudden weight gain may mean that your heart failure is getting worse. · Keep a daily record of your symptoms. Write down any changes in how you feel, such as new shortness of breath, cough, or problems eating. Also record if your ankles are more swollen than usual and if you feel more tired than usual. Note anything that you ate or did that could have triggered these changes.   Limit sodium  Sodium causes your body to hold on to extra water. This may cause your heart failure symptoms to get worse. People get most of their sodium from processed foods. Fast food and restaurant meals also tend to be very high in sodium. · Your doctor may suggest that you limit sodium to 2,000 milligrams (mg) a day or less. That is less than 1 teaspoon of salt a day, including all the salt you eat in cooking or in packaged foods. · Read food labels on cans and food packages. They tell you how much sodium you get in one serving. Check the serving size. If you eat more than one serving, you are getting more sodium. · Be aware that sodium can come in forms other than salt, including monosodium glutamate (MSG), sodium citrate, and sodium bicarbonate (baking soda). MSG is often added to Asian food. You can sometimes ask for food without MSG or salt. · Slowly reducing salt will help you adjust to the taste. Take the salt shaker off the table. · Flavor your food with garlic, lemon juice, onion, vinegar, herbs, and spices instead of salt. Do not use soy sauce, steak sauce, onion salt, garlic salt, mustard, or ketchup on your food, unless it is labeled \"low-sodium\" or \"low-salt. \"  · Make your own salad dressings, sauces, and ketchup without adding salt. · Use fresh or frozen ingredients, instead of canned ones, whenever you can. Choose low-sodium canned goods. · Eat less processed food and food from restaurants, including fast food. Exercise as directed  Moderate, regular exercise is very good for your heart. It improves your blood flow and helps control your weight. But too much exercise can stress your heart and cause a heart failure flare-up. · Check with your doctor before you start an exercise program.  · Walking is an easy way to get exercise. Start out slowly. Gradually increase the length and pace of your walk. Swimming, riding a bike, and using a treadmill are also good forms of exercise.   · When you exercise, watch for signs that your heart is working too hard. You are pushing yourself too hard if you cannot talk while you are exercising. If you become short of breath or dizzy or have chest pain, stop, sit down, and rest.  · Do not exercise when you do not feel well. Take medicines correctly  · Take your medicines exactly as prescribed. Call your doctor if you think you are having a problem with your medicine. · Make a list of all the medicines you take. Include those prescribed to you by other doctors and any over-the-counter medicines, vitamins, or supplements you take. Take this list with you when you go to any doctor. · Take your medicines at the same time every day. It may help you to post a list of all the medicines you take every day and what time of day you take them. · Make taking your medicine as simple as you can. Plan times to take your medicines when you are doing other things, such as eating a meal or getting ready for bed. This will make it easier to remember to take your medicines. · Get organized. Use helpful tools, such as daily or weekly pill containers. When should you call for help? Call 911 if you have symptoms of sudden heart failure such as:  · You have severe trouble breathing. · You cough up pink, foamy mucus. · You have a new irregular or rapid heartbeat. Call your doctor now or seek immediate medical care if:  · You have new or increased shortness of breath. · You are dizzy or lightheaded, or you feel like you may faint. · You have sudden weight gain, such as more than 2 to 3 pounds in a day or 5 pounds in a week. (Your doctor may suggest a different range of weight gain.)  · You have increased swelling in your legs, ankles, or feet. · You are suddenly so tired or weak that you cannot do your usual activities. Watch closely for changes in your health, and be sure to contact your doctor if you develop new symptoms. Where can you learn more? Go to http://mateus-viv.info/.   Enter S941 in the search box to learn more about \"Avoiding Triggers With Heart Failure: Care Instructions. \"  Current as of: February 23, 2017  Content Version: 11.3  © 7677-0566 [employer]kMetrics, Neverware. Care instructions adapted under license by [employer] (which disclaims liability or warranty for this information). If you have questions about a medical condition or this instruction, always ask your healthcare professional. [address] any warranty or liability for your use of this information.

## 2017-09-23 DIAGNOSIS — I10 HYPERTENSION, ESSENTIAL: ICD-10-CM

## 2017-09-23 DIAGNOSIS — I48.0 PAROXYSMAL ATRIAL FIBRILLATION (HCC): ICD-10-CM

## 2017-09-25 RX ORDER — RIVAROXABAN 20 MG/1
TABLET, FILM COATED ORAL
Qty: 30 TAB | Refills: 4 | Status: SHIPPED | OUTPATIENT
Start: 2017-09-25 | End: 2018-02-19 | Stop reason: SDUPTHER

## 2017-09-25 RX ORDER — AMLODIPINE BESYLATE 5 MG/1
TABLET ORAL
Qty: 30 TAB | Refills: 2 | Status: SHIPPED | OUTPATIENT
Start: 2017-09-25 | End: 2017-12-18 | Stop reason: SDUPTHER

## 2017-11-06 ENCOUNTER — OFFICE VISIT (OUTPATIENT)
Dept: CARDIOLOGY CLINIC | Age: 82
End: 2017-11-06

## 2017-11-06 VITALS
WEIGHT: 133 LBS | DIASTOLIC BLOOD PRESSURE: 69 MMHG | HEART RATE: 67 BPM | BODY MASS INDEX: 24.48 KG/M2 | HEIGHT: 62 IN | SYSTOLIC BLOOD PRESSURE: 143 MMHG

## 2017-11-06 DIAGNOSIS — I08.0 MITRAL AND AORTIC REGURGITATION: ICD-10-CM

## 2017-11-06 DIAGNOSIS — I48.0 PAROXYSMAL ATRIAL FIBRILLATION (HCC): Primary | ICD-10-CM

## 2017-11-06 DIAGNOSIS — J98.4 CHRONIC LUNG DISEASE: ICD-10-CM

## 2017-11-06 DIAGNOSIS — I10 ESSENTIAL HYPERTENSION, BENIGN: ICD-10-CM

## 2017-11-06 DIAGNOSIS — E78.5 HYPERLIPIDEMIA, UNSPECIFIED HYPERLIPIDEMIA TYPE: ICD-10-CM

## 2017-11-06 RX ORDER — LANOLIN ALCOHOL/MO/W.PET/CERES
1000 CREAM (GRAM) TOPICAL DAILY
COMMUNITY
End: 2019-11-05 | Stop reason: ALTCHOICE

## 2017-11-06 NOTE — PROGRESS NOTES
1Where: . Have you been to the ER, urgent care clinic since your last visit? Hospitalized since your last visit?     no    2. Have you seen or consulted any other health care providers outside of the [address] [place] [address] since your last visit? Include any pap smears or colon screening. No     3. Since your last visit, have you had any of the following symptoms? no       4. Have you had any blood work, X-rays or cardiac testing?       YpcpWhere: pcp

## 2017-11-06 NOTE — MR AVS SNAPSHOT
Visit Information Date & Time Provider Department Dept. Phone Encounter #  
 11/6/2017  8:45 AM Thad Church MD [phone] Follow-up Instructions Return in about 6 months (around 5/6/2018). Your Appointments 11/6/2017  8:45 AM  
Follow Up with Thad Church MD  
[facility] ([facility]) Appt Note: 6 month follow up/labs; confirmed appt/dg [address] [place] [address] [address]  
  
   
 [address] [id number] [address] [address] Upcoming Health Maintenance Date Due DTaP/Tdap/Td series (1 - Tdap) 11/23/1956 ZOSTER VACCINE AGE 60> 9/23/1995 GLAUCOMA SCREENING Q2Y 11/23/2000 OSTEOPOROSIS SCREENING (DEXA) 11/23/2000 Pneumococcal 65+ Low/Medium Risk (1 of 2 - PCV13) 11/23/2000 MEDICARE YEARLY EXAM 11/23/2000 INFLUENZA AGE 9 TO ADULT 8/1/2017 Allergies as of 11/6/2017  Review Complete On: 11/6/2017 By: Thad Church MD  
  
 Severity Noted Reaction Type Reactions Lisinopril  02/27/2013    Cough *Antihypertensives * Current Immunizations  Never Reviewed No immunizations on file. Not reviewed this visit You Were Diagnosed With   
  
 Codes Comments Paroxysmal atrial fibrillation (HCC)    -  Primary ICD-10-CM: I48.0 ICD-9-CM: 427.31 stable 
on xarelto Essential hypertension, benign     ICD-10-CM: I10 
ICD-9-CM: 401.1 stable 
recent decrease in diovan due to low bp by pcp Hyperlipidemia, unspecified hyperlipidemia type     ICD-10-CM: E78.5 ICD-9-CM: 272.4 stable 
lab with pcp Mitral and aortic regurgitation     ICD-10-CM: I08.0 ICD-9-CM: 396.3 stable Chronic lung disease     ICD-10-CM: J98.4 ICD-9-CM: 518.89 stable Vitals BP Pulse Height(growth percentile) Weight(growth percentile) BMI OB Status 143/69 67 5' 2\" (1.575 m) 133 lb (60.3 kg) 24.33 kg/m2 Hysterectomy Smoking Status Never Smoker Vitals History BMI and BSA Data Body Mass Index Body Surface Area  
 24.33 kg/m 2 1.62 m 2 Preferred Pharmacy Pharmacy Name Phone [id number] - [place] [address], [address] [phone] Your Updated Medication List  
  
   
This list is accurate as of: 11/6/17  8:41 AM.  Always use your most recent med list.  
  
  
  
  
 ALEVE 220 mg tablet Generic drug:  naproxen sodium Take 220 mg by mouth as needed. ALLERGY RELIEF (LORATADINE) 10 mg dissolvable tablet Generic drug:  loratadine Take 10 mg by mouth daily. amLODIPine 5 mg tablet Commonly known as:  [place]ach TAKE ONE TABLET BY MOUTH ONE TIME DAILY  
  
 cyanocobalamin 1,000 mcg tablet Take 1,000 mcg by mouth daily. furosemide 20 mg tablet Commonly known as:  LASIX Take  by mouth as needed. valsartan-hydroCHLOROthiazide 160-12.5 mg per tablet Commonly known as:  DIOVAN-HCT Take 1 Tab by mouth daily. XARELTO 20 mg Tab tablet Generic drug:  rivaroxaban TAKE ONE TABLET BY MOUTH ONE TIME DAILY Follow-up Instructions Return in about 6 months (around 5/6/2018). Introducing hospitals & HEALTH SERVICES! Romayne Duster introduces [employer] / [employer] patient portal. Now you can access parts of your medical record, email your doctor's office, and request medication refills online. 1. In your internet browser, go to [email or web address] 2. Click on the First Time User? Click Here link in the Sign In box. You will see the New Member Sign Up page. 3. Enter your [employer] / [employer] Access Code exactly as it appears below. You will not need to use this code after youve completed the sign-up process. If you do not sign up before the expiration date, you must request a new code. · [employer] / [employer] Access Code: [email or web address] Expires: 11/9/2017  9:29 AM 
 
4.  Enter the last four digits of your Social Security Number (xxxx) and Date of Birth (mm/dd/yyyy) as indicated and click Submit. You will be taken to the next sign-up page. 5. Create a [employer] ID. This will be your [employer] login ID and cannot be changed, so think of one that is secure and easy to remember. 6. Create a [employer] password. You can change your password at any time. 7. Enter your Password Reset Question and Answer. This can be used at a later time if you forget your password. 8. Enter your e-mail address. You will receive e-mail notification when new information is available in [address] 9. Click Sign Up. You can now view and download portions of your medical record. 10. Click the Download Summary menu link to download a portable copy of your medical information. If you have questions, please visit the Frequently Asked Questions section of the [employer] website. Remember, [employer] is NOT to be used for urgent needs. For medical emergencies, dial 911. Now available from your iPhone and Android! Please provide this summary of care documentation to your next provider. Your primary care clinician is listed as Zoe Brown. If you have any questions after today's visit, please call [phone].

## 2017-11-06 NOTE — PROGRESS NOTES
HISTORY OF PRESENT ILLNESS  Francisco Fernando is a 80 y.o. female. HPI Comments: Patient with a fib,htn,on amiodarone. On follow up patient denies any chest pains,sob, palpitation or other significant symptoms. Palpitations    The history is provided by the patient. This is a chronic (AFib) problem. The problem occurs rarely. Associated symptoms include lower extremity edema. Pertinent negatives include no fever, no chest pain, no claudication, no orthopnea, no PND, no abdominal pain, no nausea, no vomiting, no headaches, no dizziness, no weakness, no cough, no hemoptysis, no shortness of breath and no sputum production. Leg Swelling   The history is provided by the patient. This is a new problem. The current episode started more than 1 week ago (4-5/17). The problem occurs daily. The problem has been gradually improving (since got diuretics from urgent care). Pertinent negatives include no chest pain, no abdominal pain, no headaches and no shortness of breath. The symptoms are aggravated by standing. The symptoms are relieved by medications and sleep. Shortness of Breath   The history is provided by the patient. This is a new problem. The problem occurs intermittently. The current episode started more than 1 week ago. The problem has been resolved. Pertinent negatives include no fever, no headaches, no cough, no sputum production, no hemoptysis, no wheezing, no PND, no orthopnea, no chest pain, no vomiting, no abdominal pain, no rash, no leg swelling and no claudication. The problem's precipitants include exercise (steps). Review of Systems   Constitutional: Negative for chills and fever. HENT: Negative for nosebleeds. Eyes: Negative for blurred vision and double vision. Respiratory: Negative for cough, hemoptysis, sputum production, shortness of breath and wheezing. Cardiovascular: Positive for palpitations. Negative for chest pain, orthopnea, claudication, leg swelling and PND. Gastrointestinal: Negative for abdominal pain, heartburn, nausea and vomiting. Musculoskeletal: Negative for myalgias. Skin: Negative for rash. Neurological: Negative for dizziness, weakness and headaches. Endo/Heme/Allergies: Does not bruise/bleed easily. Family History   Problem Relation Age of Onset    Heart Disease Neg Hx      no family history of heart disease       Past Medical History:   Diagnosis Date    Atrial fibrillation ([facility] [address]     Assessment: Maintaining S Sheng on PO amiodarone pft followed by dr Margarita Pedroza. tsh/lft: mildly increased tsh, t4 normal, pf stable, rpt labs pending with pmd    CAD (coronary artery disease)     High cholesterol, a- fib    Chronic airway obstruction, not elsewhere classified 3/21/2013    Chronic lung disease     Essential hypertension, benign     Stable, edema better after receiving hctz    Hypertension     Other and unspecified hyperlipidemia     On Crestor and Fish Oil       Past Surgical History:   Procedure Laterality Date    HX HYSTERECTOMY      HX SPLENECTOMY         Social History   Substance Use Topics    Smoking status: Never Smoker    Smokeless tobacco: Never Used    Alcohol use Yes      Comment: socially       Allergies   Allergen Reactions    Lisinopril Cough     *Antihypertensives *       Outpatient Prescriptions Marked as Taking for the 11/6/17 encounter (Office Visit) with Mitchell Jarvis MD   Medication Sig Dispense Refill    cyanocobalamin 1,000 mcg tablet Take 1,000 mcg by mouth daily.  amLODIPine (NORVASC) 5 mg tablet TAKE ONE TABLET BY MOUTH ONE TIME DAILY  30 Tab 2    XARELTO 20 mg tab tablet TAKE ONE TABLET BY MOUTH ONE TIME DAILY  30 Tab 4    furosemide (LASIX) 20 mg tablet Take  by mouth as needed.  valsartan-hydroCHLOROthiazide (DIOVAN-HCT) 160-12.5 mg per tablet Take 1 Tab by mouth daily.  naproxen sodium (ALEVE) 220 mg tablet Take 220 mg by mouth as needed.       loratadine (ALLERGY RELIEF, LORATADINE,) 10 mg dissolvable tablet Take 10 mg by mouth daily. Visit Vitals    /69    Pulse 67    Ht 5' 2\" (1.575 m)    Wt 60.3 kg (133 lb)    BMI 24.33 kg/m2       Physical Exam   Constitutional: She is oriented to person, place, and time. She appears well-developed and well-nourished. HENT:   Head: Normocephalic and atraumatic. Eyes: Conjunctivae are normal.   Neck: Neck supple. No JVD present. No tracheal deviation present. No thyromegaly present. Cardiovascular: Normal rate. An irregularly irregular rhythm present. PMI is not displaced. Exam reveals no gallop, no S3 and no decreased pulses. No murmur heard. Pulmonary/Chest: No respiratory distress. She has no wheezes. She has no rales. She exhibits no tenderness. Abdominal: Soft. There is no tenderness. Musculoskeletal: She exhibits no edema. Neurological: She is alert and oriented to person, place, and time. Skin: Skin is warm. Psychiatric: She has a normal mood and affect. Ms. Maria Isabel Castro has a reminder for a \"due or due soon\" health maintenance. I have asked that she contact her primary care provider for follow-up on this health maintenance. I Have personally reviewed recent relevant labs available and discussed with patient  3/2016-tsh -high-repeat free t4  lft-normal  CARDIOLOGY STUDIES 7/1/2012 9/1/2010 10/1/2009 4/1/2009 1/1/2009 12/1/2008   Myocardial Perfusion Scan Result - - - - nl scan, EF 70% -   Echocardiogram - Complete Result - - - - - EF 70%, PAP 30   Cardioversion Result - - - successful - -   PFT's with DLCO Result dlco 73, stable see report - - - -   24 hr Holter Monitor Result - - see report - - -   Some recent data might be hidden     I have discussed risk benefit and option of use of amiodarone for arrythmia. Risk of toxicity with medication are informed. Patient will require careful monitoring.  ekg  A fib-4/2017  SUMMARY:4/2017  Left ventricle: Systolic function was normal. Ejection fraction was  estimated to be 55 %. There were no regional wall motion abnormalities. Features were consistent with a pseudonormal left ventricular filling  pattern, with concomitant abnormal relaxation and increased filling  pressure (grade 2 diastolic dysfunction). Left atrium: The atrium was mildly dilated. Right atrium: The atrium was dilated. Mitral valve: There was mild annular calcification. There was mild  regurgitation. Aortic valve: There was mild regurgitation. 4/2017  holter-a fib    4/2017  pft-minimal obstructive and mild diffusion defect-no change 2012  Assessment         4/2017-recurrent-amiodarone stopped-has tried multiple other meds in past  5/2017-discussed options of ablation -wants to continue rate control startegy    I Have personally reviewed recent relevant labs available and discussed with patient      Assessment         ICD-10-CM ICD-9-CM    1. Paroxysmal atrial fibrillation (HCC) I48.0 427.31     stable  on xarelto   2. Essential hypertension, benign I10 401.1     stable  recent decrease in diovan due to low bp by pcp   3. Hyperlipidemia, unspecified hyperlipidemia type E78.5 272.4     stable  lab with pcp   4. Mitral and aortic regurgitation I08.0 396.3     stable   5. Chronic lung disease J98.4 518.89     stable       There are no discontinued medications. No orders of the defined types were placed in this encounter. Follow-up Disposition:  Return in about 6 months (around 5/6/2018).

## 2017-11-06 NOTE — LETTER
Ed Peres 
1935 11/6/2017 Dear Alexandria Akhtar MD 
 
I had the pleasure of evaluating  Ms. Yvon Torres in office today. Below are the relevant portions of my assessment and plan of care. ICD-10-CM ICD-9-CM 1. Paroxysmal atrial fibrillation (HCC) I48.0 427.31   
 stable 
on xarelto 2. Essential hypertension, benign I10 401.1   
 stable 
recent decrease in diovan due to low bp by pcp 3. Hyperlipidemia, unspecified hyperlipidemia type E78.5 272.4   
 stable 
lab with pcp 4. Mitral and aortic regurgitation I08.0 396.3   
 stable 5. Chronic lung disease J98.4 518.89   
 stable Current Outpatient Prescriptions Medication Sig Dispense Refill  cyanocobalamin 1,000 mcg tablet Take 1,000 mcg by mouth daily.  amLODIPine (NORVASC) 5 mg tablet TAKE ONE TABLET BY MOUTH ONE TIME DAILY  30 Tab 2  XARELTO 20 mg tab tablet TAKE ONE TABLET BY MOUTH ONE TIME DAILY  30 Tab 4  furosemide (LASIX) 20 mg tablet Take  by mouth as needed.  valsartan-hydroCHLOROthiazide (DIOVAN-HCT) 160-12.5 mg per tablet Take 1 Tab by mouth daily.  naproxen sodium (ALEVE) 220 mg tablet Take 220 mg by mouth as needed.  loratadine (ALLERGY RELIEF, LORATADINE,) 10 mg dissolvable tablet Take 10 mg by mouth daily. Orders Placed This Encounter  cyanocobalamin 1,000 mcg tablet Sig: Take 1,000 mcg by mouth daily. If you have questions, please do not hesitate to call me. I look forward to following Ms. Yvon Torres along with you. Sincerely, Edy Zaldivar MD

## 2017-12-18 DIAGNOSIS — I10 HYPERTENSION, ESSENTIAL: ICD-10-CM

## 2017-12-18 RX ORDER — AMLODIPINE BESYLATE 5 MG/1
TABLET ORAL
Qty: 30 TAB | Refills: 1 | Status: SHIPPED | OUTPATIENT
Start: 2017-12-18 | End: 2018-03-08 | Stop reason: ALTCHOICE

## 2018-02-19 DIAGNOSIS — I48.0 PAROXYSMAL ATRIAL FIBRILLATION (HCC): ICD-10-CM

## 2018-02-19 DIAGNOSIS — I10 ESSENTIAL HYPERTENSION, BENIGN: Primary | ICD-10-CM

## 2018-02-19 RX ORDER — FUROSEMIDE 20 MG/1
20 TABLET ORAL DAILY
Qty: 30 TAB | Refills: 3 | Status: SHIPPED | OUTPATIENT
Start: 2018-02-19 | End: 2018-05-21 | Stop reason: SDUPTHER

## 2018-02-19 RX ORDER — RIVAROXABAN 20 MG/1
TABLET, FILM COATED ORAL
Qty: 15 TAB | Refills: 3 | Status: SHIPPED | OUTPATIENT
Start: 2018-02-19 | End: 2018-03-23 | Stop reason: SDUPTHER

## 2018-02-19 NOTE — TELEPHONE ENCOUNTER
Requested Prescriptions     Pending Prescriptions Disp Refills    furosemide (LASIX) 20 mg tablet       Sig: Take  by mouth as needed.

## 2018-03-08 ENCOUNTER — OFFICE VISIT (OUTPATIENT)
Dept: CARDIOLOGY CLINIC | Age: 83
End: 2018-03-08

## 2018-03-08 VITALS
BODY MASS INDEX: 25.03 KG/M2 | HEART RATE: 63 BPM | SYSTOLIC BLOOD PRESSURE: 126 MMHG | DIASTOLIC BLOOD PRESSURE: 71 MMHG | WEIGHT: 136 LBS | HEIGHT: 62 IN

## 2018-03-08 DIAGNOSIS — I48.91 ATRIAL FIBRILLATION, UNSPECIFIED TYPE (HCC): Primary | ICD-10-CM

## 2018-03-08 DIAGNOSIS — I08.0 MITRAL AND AORTIC REGURGITATION: ICD-10-CM

## 2018-03-08 DIAGNOSIS — I50.32 CHRONIC DIASTOLIC CONGESTIVE HEART FAILURE (HCC): ICD-10-CM

## 2018-03-08 DIAGNOSIS — E78.5 HYPERLIPIDEMIA, UNSPECIFIED HYPERLIPIDEMIA TYPE: ICD-10-CM

## 2018-03-08 DIAGNOSIS — I48.19 PERSISTENT ATRIAL FIBRILLATION (HCC): ICD-10-CM

## 2018-03-08 DIAGNOSIS — I10 ESSENTIAL HYPERTENSION, BENIGN: ICD-10-CM

## 2018-03-08 RX ORDER — VALSARTAN 80 MG/1
TABLET ORAL DAILY
COMMUNITY
End: 2021-12-14

## 2018-03-08 RX ORDER — DIGOXIN 125 MCG
0.12 TABLET ORAL EVERY OTHER DAY
Qty: 45 TAB | Refills: 1
Start: 2018-03-08 | End: 2018-03-16 | Stop reason: SDUPTHER

## 2018-03-08 RX ORDER — DILTIAZEM HYDROCHLORIDE 180 MG/1
CAPSULE, COATED, EXTENDED RELEASE ORAL DAILY
COMMUNITY
End: 2018-03-14 | Stop reason: SDUPTHER

## 2018-03-08 RX ORDER — DIGOXIN 125 MCG
TABLET ORAL DAILY
COMMUNITY
End: 2018-03-08 | Stop reason: SDUPTHER

## 2018-03-08 RX ORDER — BENZONATATE 100 MG/1
100 CAPSULE ORAL
COMMUNITY
End: 2021-12-14

## 2018-03-08 NOTE — PATIENT INSTRUCTIONS
Medications Discontinued During This Encounter   Medication Reason    valsartan-hydroCHLOROthiazide (DIOVAN-HCT) 160-12.5 mg per tablet Not A Current Medication    amLODIPine (NORVASC) 5 mg tablet Alternate Therapy    digoxin (LANOXIN) 0.125 mg tablet Reorder       Orders Placed This Encounter    METABOLIC PANEL, BASIC     Standing Status:   Future     Standing Expiration Date:   6/8/2018    LIPID PANEL     Standing Status:   Future     Standing Expiration Date:   6/8/2018    HEPATIC FUNCTION PANEL     Standing Status:   Future     Standing Expiration Date:   6/8/2018    DIGOXIN     Standing Status:   Future     Standing Expiration Date:   6/8/2018    AMB POC EKG ROUTINE W/ 12 LEADS, INTER & REP     Order Specific Question:   Reason for Exam:     Answer:   afib    digoxin (LANOXIN) 0.125 mg tablet     Sig: Take 1 Tab by mouth every other day. Indications: VENTRICULAR RATE CONTROL IN ATRIAL FIBRILLATION     Dispense:  39 Tab     Refill:  1          Atrial Fibrillation: Care Instructions  Your Care Instructions    Atrial fibrillation is an irregular and often fast heartbeat. Treating this condition is important for several reasons. It can cause blood clots, which can travel from your heart to your brain and cause a stroke. If you have a fast heartbeat, you may feel lightheaded, dizzy, and weak. An irregular heartbeat can also increase your risk for heart failure. Atrial fibrillation is often the result of another heart condition, such as high blood pressure or coronary artery disease. Making changes to improve your heart condition will help you stay healthy and active. Follow-up care is a key part of your treatment and safety. Be sure to make and go to all appointments, and call your doctor if you are having problems. It's also a good idea to know your test results and keep a list of the medicines you take. How can you care for yourself at home? Medicines  ? · Take your medicines exactly as prescribed. Call your doctor if you think you are having a problem with your medicine. You will get more details on the specific medicines your doctor prescribes. ? · If your doctor has given you a blood thinner to prevent a stroke, be sure you get instructions about how to take your medicine safely. Blood thinners can cause serious bleeding problems. ? · Do not take any vitamins, over-the-counter drugs, or herbal products without talking to your doctor first.   ? Lifestyle changes  ? · Do not smoke. Smoking can increase your chance of a stroke and heart attack. If you need help quitting, talk to your doctor about stop-smoking programs and medicines. These can increase your chances of quitting for good. ? · Eat a heart-healthy diet. ? · Stay at a healthy weight. Lose weight if you need to.   ? · Limit alcohol to 2 drinks a day for men and 1 drink a day for women. Too much alcohol can cause health problems. ? · Avoid colds and flu. Get a pneumococcal vaccine shot. If you have had one before, ask your doctor whether you need another dose. Get a flu shot every year. If you must be around people with colds or flu, wash your hands often. Activity  ? · If your doctor recommends it, get more exercise. Walking is a good choice. Bit by bit, increase the amount you walk every day. Try for at least 30 minutes on most days of the week. You also may want to swim, bike, or do other activities. Your doctor may suggest that you join a cardiac rehabilitation program so that you can have help increasing your physical activity safely. ? · Start light exercise if your doctor says it is okay. Even a small amount will help you get stronger, have more energy, and manage stress. Walking is an easy way to get exercise. Start out by walking a little more than you did in the hospital. Gradually increase the amount you walk. ? · When you exercise, watch for signs that your heart is working too hard.  You are pushing too hard if you cannot talk while you are exercising. If you become short of breath or dizzy or have chest pain, sit down and rest immediately. ? · Check your pulse regularly. Place two fingers on the artery at the palm side of your wrist, in line with your thumb. If your heartbeat seems uneven or fast, talk to your doctor. When should you call for help? Call 911 anytime you think you may need emergency care. For example, call if:  ? · You have symptoms of a heart attack. These may include:  ¨ Chest pain or pressure, or a strange feeling in the chest.  ¨ Sweating. ¨ Shortness of breath. ¨ Nausea or vomiting. ¨ Pain, pressure, or a strange feeling in the back, neck, jaw, or upper belly or in one or both shoulders or arms. ¨ Lightheadedness or sudden weakness. ¨ A fast or irregular heartbeat. After you call 911, the  may tell you to chew 1 adult-strength or 2 to 4 low-dose aspirin. Wait for an ambulance. Do not try to drive yourself. ? · You have symptoms of a stroke. These may include:  ¨ Sudden numbness, tingling, weakness, or loss of movement in your face, arm, or leg, especially on only one side of your body. ¨ Sudden vision changes. ¨ Sudden trouble speaking. ¨ Sudden confusion or trouble understanding simple statements. ¨ Sudden problems with walking or balance. ¨ A sudden, severe headache that is different from past headaches. ? · You passed out (lost consciousness). ?Call your doctor now or seek immediate medical care if:  ? · You have new or increased shortness of breath. ? · You feel dizzy or lightheaded, or you feel like you may faint. ? · Your heart rate becomes irregular. ? · You can feel your heart flutter in your chest or skip heartbeats. Tell your doctor if these symptoms are new or worse. ? Watch closely for changes in your health, and be sure to contact your doctor if you have any problems. Where can you learn more? Go to http://renetta.info/.   Enter U020 in the search box to learn more about \"Atrial Fibrillation: Care Instructions. \"  Current as of: September 21, 2016  Content Version: 11.4  © 3754-6515 [employer]. Care instructions adapted under license by [employer] (which disclaims liability or warranty for this information). If you have questions about a medical condition or this instruction, always ask your healthcare professional. [place] [address] any warranty or liability for your use of this information.

## 2018-03-08 NOTE — MR AVS SNAPSHOT
Theodore Albarran 
 
 
 [address] [facility] 
[phone] Patient: Kobe Mcbride MRN: [id number] :1935 Visit Information Date & Time Provider Department Dept. Phone Encounter #  
 3/8/2018 12:45 PM Rachel Kenyon MD [facility] [phone] Follow-up Instructions Return in about 1 month (around 2018), or if symptoms worsen or fail to improve, for with Dr Senait Quigley. Your Appointments 2018 12:15 PM  
Office Visit with Nicole Hill MD  
[facility] [place] [facility] Appt Note: 1 month post Lipid, LFT, BMP and Dig  
 [address] [facility] [address] [place]  [address] [facility] [address]  
  
   
 [address] [address] [place] [address] Upcoming Health Maintenance Date Due DTaP/Tdap/Td series (1 - Tdap) 1956 ZOSTER VACCINE AGE 60> 1995 GLAUCOMA SCREENING Q2Y 2000 Bone Densitometry (Dexa) Screening 2000 Pneumococcal 65+ Low/Medium Risk (1 of 2 - PCV13) 2000 Allergies as of 3/8/2018  Review Complete On: 3/8/2018 By: Cherise Montes Severity Noted Reaction Type Reactions Lisinopril  2013    Cough *Antihypertensives * Current Immunizations  Never Reviewed No immunizations on file. Not reviewed this visit You Were Diagnosed With   
  
 Codes Comments [facility]    -  Primary ICD-10-CM: I48.91 
ICD-9-CM: 427.31 Persistent atrial fibrillation (HCC)     ICD-10-CM: I48.1 ICD-9-CM: 427.31 3/18 slow HR- reduce digoxin;  
 Essential hypertension, benign     ICD-10-CM: I10 
ICD-9-CM: 401.1 Controlled Mitral and aortic regurgitation     ICD-10-CM: I08.0 ICD-9-CM: 396.3 Mild, asymptomatic Hyperlipidemia, unspecified hyperlipidemia type     ICD-10-CM: E78.5 ICD-9-CM: 272.4  [id number], says she is taking lipitor now;  
 Chronic diastolic congestive heart failure ([facility]     ICD-10-CM: I50.32 
ICD-9-CM: 428.32, 428.0 3/18 NYHA2-3 
get labs from PCP few wks ago post d/c Vitals BP Pulse Height(growth percentile) Weight(growth percentile) BMI OB Status 126/71 63 5' 2\" (1.575 m) 136 lb (61.7 kg) 24.87 kg/m2 Hysterectomy Smoking Status Never Smoker Vitals History BMI and BSA Data Body Mass Index Body Surface Area  
 24.87 kg/m 2 1.64 m 2 Preferred Pharmacy Pharmacy Name Phone [id number] - [address] [phone] Your Updated Medication List  
  
   
This list is accurate as of 3/8/18  2:23 PM.  Always use your most recent med list.  
  
  
  
  
 ALEVE 220 mg tablet Generic drug:  naproxen sodium Take 220 mg by mouth as needed. ALLERGY RELIEF (LORATADINE) 10 mg dissolvable tablet Generic drug:  loratadine Take 10 mg by mouth daily. benzonatate 100 mg capsule Commonly known as:  TESSALON Take 100 mg by mouth three (3) times daily as needed for Cough. CARTIA  mg ER capsule Generic drug:  dilTIAZem CD Take  by mouth daily. cyanocobalamin 1,000 mcg tablet Take 1,000 mcg by mouth daily. digoxin 0.125 mg tablet Commonly known as:  LANOXIN Take 1 Tab by mouth every other day. Indications: VENTRICULAR RATE CONTROL IN ATRIAL FIBRILLATION  
  
 furosemide 20 mg tablet Commonly known as:  LASIX Take 1 Tab by mouth daily. valsartan 80 mg tablet Commonly known as:  DIOVAN Take  by mouth daily. XARELTO 20 mg Tab tablet Generic drug:  rivaroxaban TAKE ONE TABLET BY MOUTH ONE TIME DAILY We Performed the Following AMB POC EKG ROUTINE W/ 12 LEADS, INTER & REP [50512 CPT(R)] Follow-up Instructions Return in about 1 month (around 4/8/2018), or if symptoms worsen or fail to improve, for with Dr Niles Harp.   
  
To-Do List   
 Around 03/18/2018 Lab:  DIGOXIN Around 03/18/2018 Lab:  HEPATIC FUNCTION PANEL Around 03/18/2018 Lab:  LIPID PANEL Around 03/18/2018 Lab:  METABOLIC PANEL, BASIC Patient Instructions Medications Discontinued During This Encounter Medication Reason  valsartan-hydroCHLOROthiazide (DIOVAN-HCT) 160-12.5 mg per tablet Not A Current Medication  amLODIPine (NORVASC) 5 mg tablet Alternate Therapy  digoxin (LANOXIN) 0.125 mg tablet Reorder Orders Placed This Encounter  METABOLIC PANEL, BASIC Standing Status:   Future Standing Expiration Date:   6/8/2018  LIPID PANEL Standing Status:   Future Standing Expiration Date:   6/8/2018  
 HEPATIC FUNCTION PANEL Standing Status:   Future Standing Expiration Date:   6/8/2018  DIGOXIN Standing Status:   Future Standing Expiration Date:   6/8/2018  AMB POC EKG ROUTINE W/ 12 LEADS, INTER & REP Order Specific Question:   Reason for Exam: Answer:   afib  digoxin (LANOXIN) 0.125 mg tablet Sig: Take 1 Tab by mouth every other day. Indications: VENTRICULAR RATE CONTROL IN ATRIAL FIBRILLATION Dispense:  45 Tab Refill:  1 Atrial Fibrillation: Care Instructions Your Care Instructions Atrial fibrillation is an irregular and often fast heartbeat. Treating this condition is important for several reasons. It can cause blood clots, which can travel from your heart to your brain and cause a stroke. If you have a fast heartbeat, you may feel lightheaded, dizzy, and weak. An irregular heartbeat can also increase your risk for heart failure. Atrial fibrillation is often the result of another heart condition, such as high blood pressure or coronary artery disease. Making changes to improve your heart condition will help you stay healthy and active. Follow-up care is a key part of your treatment and safety.  Be sure to make and go to all appointments, and call your doctor if you are having problems. It's also a good idea to know your test results and keep a list of the medicines you take. How can you care for yourself at home? Medicines ? · Take your medicines exactly as prescribed. Call your doctor if you think you are having a problem with your medicine. You will get more details on the specific medicines your doctor prescribes. ? · If your doctor has given you a blood thinner to prevent a stroke, be sure you get instructions about how to take your medicine safely. Blood thinners can cause serious bleeding problems. ? · Do not take any vitamins, over-the-counter drugs, or herbal products without talking to your doctor first. ? Lifestyle changes ? · Do not smoke. Smoking can increase your chance of a stroke and heart attack. If you need help quitting, talk to your doctor about stop-smoking programs and medicines. These can increase your chances of quitting for good. ? · Eat a heart-healthy diet. ? · Stay at a healthy weight. Lose weight if you need to.  
? · Limit alcohol to 2 drinks a day for men and 1 drink a day for women. Too much alcohol can cause health problems. ? · Avoid colds and flu. Get a pneumococcal vaccine shot. If you have had one before, ask your doctor whether you need another dose. Get a flu shot every year. If you must be around people with colds or flu, wash your hands often. Activity ? · If your doctor recommends it, get more exercise. Walking is a good choice. Bit by bit, increase the amount you walk every day. Try for at least 30 minutes on most days of the week. You also may want to swim, bike, or do other activities. Your doctor may suggest that you join a cardiac rehabilitation program so that you can have help increasing your physical activity safely. ? · Start light exercise if your doctor says it is okay.  Even a small amount will help you get stronger, have more energy, and manage stress. Walking is an easy way to get exercise. Start out by walking a little more than you did in the hospital. Gradually increase the amount you walk. ? · When you exercise, watch for signs that your heart is working too hard. You are pushing too hard if you cannot talk while you are exercising. If you become short of breath or dizzy or have chest pain, sit down and rest immediately. ? · Check your pulse regularly. Place two fingers on the artery at the palm side of your wrist, in line with your thumb. If your heartbeat seems uneven or fast, talk to your doctor. When should you call for help? Call 911 anytime you think you may need emergency care. For example, call if: 
? · You have symptoms of a heart attack. These may include: ¨ Chest pain or pressure, or a strange feeling in the chest. 
¨ Sweating. ¨ Shortness of breath. ¨ Nausea or vomiting. ¨ Pain, pressure, or a strange feeling in the back, neck, jaw, or upper belly or in one or both shoulders or arms. ¨ Lightheadedness or sudden weakness. ¨ A fast or irregular heartbeat. After you call 911, the  may tell you to chew 1 adult-strength or 2 to 4 low-dose aspirin. Wait for an ambulance. Do not try to drive yourself. ? · You have symptoms of a stroke. These may include: 
¨ Sudden numbness, tingling, weakness, or loss of movement in your face, arm, or leg, especially on only one side of your body. ¨ Sudden vision changes. ¨ Sudden trouble speaking. ¨ Sudden confusion or trouble understanding simple statements. ¨ Sudden problems with walking or balance. ¨ A sudden, severe headache that is different from past headaches. ? · You passed out (lost consciousness). ?Call your doctor now or seek immediate medical care if: 
? · You have new or increased shortness of breath. ? · You feel dizzy or lightheaded, or you feel like you may faint. ? · Your heart rate becomes irregular. ? · You can feel your heart flutter in your chest or skip heartbeats. Tell your doctor if these symptoms are new or worse. ? Watch closely for changes in your health, and be sure to contact your doctor if you have any problems. Where can you learn more? Go to http://mateus-viv.info/. Enter U020 in the search box to learn more about \"Atrial Fibrillation: Care Instructions. \" Current as of: September 21, 2016 Content Version: 11.4 © 3032-5527 [employer]. Care instructions adapted under license by [employer] (which disclaims liability or warranty for this information). If you have questions about a medical condition or this instruction, always ask your healthcare professional. [address] any warranty or liability for your use of this information. Introducing [facility]! Julien Baldwin introduces Giftbar patient portal. Now you can access parts of your medical record, email your doctor's office, and request medication refills online. 1. In your internet browser, go to [email or web address] 2. Click on the First Time User? Click Here link in the Sign In box. You will see the New Member Sign Up page. 3. Enter your Giftbar Access Code exactly as it appears below. You will not need to use this code after youve completed the sign-up process. If you do not sign up before the expiration date, you must request a new code. · Giftbar Access Code: [email or web address] Expires: 6/6/2018 12:01 PM 
 
4. Enter the last four digits of your Social Security Number (xxxx) and Date of Birth (mm/dd/yyyy) as indicated and click Submit. You will be taken to the next sign-up page. 5. Create a Giftbar ID. This will be your Giftbar login ID and cannot be changed, so think of one that is secure and easy to remember. 6. Create a Giftbar password. You can change your password at any time. 7. Enter your Password Reset Question and Answer. This can be used at a later time if you forget your password. 8. Enter your e-mail address. You will receive e-mail notification when new information is available in [address] 9. Click Sign Up. You can now view and download portions of your medical record. 10. Click the Download Summary menu link to download a portable copy of your medical information. If you have questions, please visit the Frequently Asked Questions section of the [employer] website. Remember, [employer] is NOT to be used for urgent needs. For medical emergencies, dial 911. Now available from your iPhone and Android! Please provide this summary of care documentation to your next provider. Your primary care clinician is listed as Mireille Velásquez. If you have any questions after today's visit, please call [phone].

## 2018-03-08 NOTE — LETTER
Daniel Harman 
1935 
 
3/8/2018 Dear Vickie Delgado MD 
 
I had the pleasure of evaluating  Ms. Phil Schaumann in office today. Below are the relevant portions of my assessment and plan of care. ICD-10-CM ICD-9-CM 1. Atrial fibrillation, unspecified type ([facility] I48.91 427.31 AMB POC EKG ROUTINE W/ 12 LEADS, INTER & REP  
   DIGOXIN 2. Persistent atrial fibrillation (HCC) I48.1 427.31 digoxin (LANOXIN) 0.125 mg tablet 3/18 slow HR- reduce digoxin; 3. Essential hypertension, benign I10 401.1 Controlled 4. Mitral and aortic regurgitation I08.0 396.3 Mild, asymptomatic 5. Hyperlipidemia, unspecified hyperlipidemia type E78.5 272.4 LIPID PANEL  
   HEPATIC FUNCTION PANEL  
 1/18 [id number], says she is taking lipitor now;  
6. Chronic diastolic congestive heart failure ([facility] W78.37 152.28 METABOLIC PANEL, BASIC  
  428.0   
 3/18 NYHA2-3 
get labs from PCP few wks ago post d/c Current Outpatient Prescriptions Medication Sig Dispense Refill  valsartan (DIOVAN) 80 mg tablet Take  by mouth daily.  dilTIAZem CD (CARTIA XT) 180 mg ER capsule Take  by mouth daily.  benzonatate (TESSALON) 100 mg capsule Take 100 mg by mouth three (3) times daily as needed for Cough.  digoxin (LANOXIN) 0.125 mg tablet Take 1 Tab by mouth every other day. Indications: VENTRICULAR RATE CONTROL IN ATRIAL FIBRILLATION 45 Tab 1  XARELTO 20 mg tab tablet TAKE ONE TABLET BY MOUTH ONE TIME DAILY  15 Tab 3  furosemide (LASIX) 20 mg tablet Take 1 Tab by mouth daily. 30 Tab 3  cyanocobalamin 1,000 mcg tablet Take 1,000 mcg by mouth daily.  naproxen sodium (ALEVE) 220 mg tablet Take 220 mg by mouth as needed.  loratadine (ALLERGY RELIEF, LORATADINE,) 10 mg dissolvable tablet Take 10 mg by mouth daily. Orders Placed This Encounter  METABOLIC PANEL, BASIC Standing Status:   Future Standing Expiration Date:   6/8/2018  LIPID PANEL  
 Standing Status:   Future Standing Expiration Date:   6/8/2018  
 HEPATIC FUNCTION PANEL Standing Status:   Future Standing Expiration Date:   6/8/2018  DIGOXIN Standing Status:   Future Standing Expiration Date:   6/8/2018  AMB POC EKG ROUTINE W/ 12 LEADS, INTER & REP Order Specific Question:   Reason for Exam: Answer:   afib  valsartan (DIOVAN) 80 mg tablet Sig: Take  by mouth daily.  dilTIAZem CD (CARTIA XT) 180 mg ER capsule Sig: Take  by mouth daily.  DISCONTD: digoxin (LANOXIN) 0.125 mg tablet Sig: Take  by mouth daily.  benzonatate (TESSALON) 100 mg capsule Sig: Take 100 mg by mouth three (3) times daily as needed for Cough.  digoxin (LANOXIN) 0.125 mg tablet Sig: Take 1 Tab by mouth every other day. Indications: VENTRICULAR RATE CONTROL IN ATRIAL FIBRILLATION Dispense:  45 Tab Refill:  1 If you have questions, please do not hesitate to call me. I look forward to following Ms. Swetha Ward along with you. Sincerely, Vance Sotelo MD

## 2018-03-08 NOTE — PROGRESS NOTES
1. Have you been to the ER, urgent care clinic since your last visit? Hospitalized since your last visit? Yes Where: [facility]/COPD    2. Have you seen or consulted any other health care providers outside of the [address] [place] [address] since your last visit? Include any pap smears or colon screening. Yes Where: Dr Alpa Tobin/PCP     3. Since your last visit, have you had any of the following symptoms? shortness of breath and dizziness. 4.  Have you had any blood work, X-rays or cardiac testing? Yes Where: [facility] Reason for visit: Labs             5.  Where do you normally have your labs drawn? [facility]    6. Do you need any refills today?    No

## 2018-03-08 NOTE — PROGRESS NOTES
HISTORY OF PRESENT ILLNESS  Luis E Garcia is a 80 y.o. female. HPI Comments: Patient with a fib,htn,    Hospital Follow Up   The history is provided by the patient and medical records (COPD AFib). Pertinent negatives include no chest pain, no abdominal pain, no headaches and no shortness of breath. Hypertension   The history is provided by the medical records. This is a chronic problem. Pertinent negatives include no chest pain, no abdominal pain, no headaches and no shortness of breath. Palpitations    The history is provided by the patient. This is a chronic (AFib) problem. The problem occurs rarely. Associated symptoms include malaise/fatigue and lower extremity edema. Pertinent negatives include no fever, no chest pain, no claudication, no orthopnea, no PND, no abdominal pain, no nausea, no vomiting, no headaches, no dizziness, no weakness, no cough, no hemoptysis, no shortness of breath and no sputum production. Her past medical history is significant for hypertension. Cholesterol Problem   The history is provided by the medical records. This is a chronic problem. Pertinent negatives include no chest pain, no abdominal pain, no headaches and no shortness of breath. Leg Swelling   The history is provided by the patient. This is a new problem. The current episode started more than 1 week ago (4-5/17). The problem occurs daily. The problem has been resolved (since got diuretics from urgent care). Pertinent negatives include no chest pain, no abdominal pain, no headaches and no shortness of breath. The symptoms are aggravated by standing. The symptoms are relieved by medications and sleep. Shortness of Breath   The history is provided by the patient. This is a recurrent problem. The problem occurs intermittently. The current episode started more than 1 week ago.  Pertinent negatives include no fever, no headaches, no cough, no sputum production, no hemoptysis, no wheezing, no PND, no orthopnea, no chest pain, no vomiting, no abdominal pain, no rash, no leg swelling and no claudication. The problem's precipitants include exercise (steps; 3/18 to bathroom). Review of Systems   Constitutional: Positive for malaise/fatigue. Negative for chills and fever. HENT: Negative for nosebleeds. Eyes: Negative for blurred vision and double vision. Respiratory: Negative for cough, hemoptysis, sputum production, shortness of breath and wheezing. Cardiovascular: Positive for palpitations. Negative for chest pain, orthopnea, claudication, leg swelling and PND. Gastrointestinal: Negative for abdominal pain, heartburn, nausea and vomiting. Musculoskeletal: Negative for myalgias. Skin: Negative for rash. Neurological: Negative for dizziness, weakness and headaches. Endo/Heme/Allergies: Does not bruise/bleed easily.      Family History   Problem Relation Age of Onset    Heart Disease Neg Hx      no family history of heart disease       Past Medical History:   Diagnosis Date    Atrial fibrillation ([facility] [address]     Assessment: Maintaining S Sheng on PO amiodarone pft followed by dr Flaquito Baeza. tsh/lft: mildly increased tsh, t4 normal, pf stable, rpt labs pending with pmd    CAD (coronary artery disease)     High cholesterol, a- fib    Chronic airway obstruction, not elsewhere classified 3/21/2013    Chronic lung disease     Essential hypertension, benign     Stable, edema better after receiving hctz    Hypertension     Other and unspecified hyperlipidemia     On Crestor and Fish Oil       Past Surgical History:   Procedure Laterality Date    HX HYSTERECTOMY      HX SPLENECTOMY         Social History   Substance Use Topics    Smoking status: Never Smoker    Smokeless tobacco: Never Used    Alcohol use Yes      Comment: socially       Allergies   Allergen Reactions    Lisinopril Cough     *Antihypertensives *       Outpatient Prescriptions Marked as Taking for the 3/8/18 encounter (Office Visit) with Cassandra Levin Diaz Best MD   Medication Sig Dispense Refill    valsartan (DIOVAN) 80 mg tablet Take  by mouth daily.  dilTIAZem CD (CARTIA XT) 180 mg ER capsule Take  by mouth daily.  digoxin (LANOXIN) 0.125 mg tablet Take  by mouth daily.  benzonatate (TESSALON) 100 mg capsule Take 100 mg by mouth three (3) times daily as needed for Cough.  XARELTO 20 mg tab tablet TAKE ONE TABLET BY MOUTH ONE TIME DAILY  15 Tab 3    furosemide (LASIX) 20 mg tablet Take 1 Tab by mouth daily. 30 Tab 3    cyanocobalamin 1,000 mcg tablet Take 1,000 mcg by mouth daily.  naproxen sodium (ALEVE) 220 mg tablet Take 220 mg by mouth as needed.  loratadine (ALLERGY RELIEF, LORATADINE,) 10 mg dissolvable tablet Take 10 mg by mouth daily. Visit Vitals    /71    Pulse 63    Ht 5' 2\" (1.575 m)    Wt 136 lb (61.7 kg)    BMI 24.87 kg/m2       Physical Exam   Constitutional: She is oriented to person, place, and time. She appears well-developed and well-nourished. HENT:   Head: Normocephalic and atraumatic. Eyes: Conjunctivae are normal.   Neck: Neck supple. No JVD present. No tracheal deviation present. No thyromegaly present. Cardiovascular: Normal rate. An irregularly irregular rhythm present. PMI is not displaced. Exam reveals no gallop, no S3 and no decreased pulses. No murmur heard. Pulmonary/Chest: No respiratory distress. She has no wheezes. She has no rales. She exhibits no tenderness. Abdominal: Soft. There is no tenderness. Musculoskeletal: She exhibits no edema. Neurological: She is alert and oriented to person, place, and time. Skin: Skin is warm. Psychiatric: She has a normal mood and affect. Ms. Raymon Mijares has a reminder for a \"due or due soon\" health maintenance. I have asked that she contact her primary care provider for follow-up on this health maintenance.   I Have personally reviewed recent relevant labs available and discussed with patient  3/2016-tsh -high-repeat free t4  lft-normal  CARDIOLOGY STUDIES 7/1/2012 9/1/2010 10/1/2009 4/1/2009 1/1/2009 12/1/2008   Myocardial Perfusion Scan Result - - - - nl scan, EF 70% -   Echocardiogram - Complete Result - - - - - EF 70%, PAP 30   Cardioversion Result - - - successful - -   PFT's with DLCO Result dlco 73, stable see report - - - -   24 hr Holter Monitor Result - - see report - - -   Some recent data might be hidden     I have discussed risk benefit and option of use of amiodarone for arrythmia. Risk of toxicity with medication are informed. Patient will require careful monitoring.  ekg  A fib-4/2017  SUMMARY:4/2017  Left ventricle: Systolic function was normal. Ejection fraction was  estimated to be 55 %. There were no regional wall motion abnormalities. Features were consistent with a pseudonormal left ventricular filling  pattern, with concomitant abnormal relaxation and increased filling  pressure (grade 2 diastolic dysfunction). Left atrium: The atrium was mildly dilated. Right atrium: The atrium was dilated. Mitral valve: There was mild annular calcification. There was mild  regurgitation. Aortic valve: There was mild regurgitation. 4/2017  holter-a fib    4/2017  pft-minimal obstructive and mild diffusion defect-no change 2012  Assessment         4/2017-recurrent-amiodarone stopped-has tried multiple other meds in past  5/2017-discussed options of ablation -wants to continue rate control startegy    I Have personally reviewed recent relevant labs available and discussed with patient      Assessment       Diagnoses and all orders for this visit:    1. Atrial fibrillation, unspecified type (HCC)  -     AMB POC EKG ROUTINE W/ 12 LEADS, INTER & REP  -     DIGOXIN; Future    2. Persistent atrial fibrillation ([address]  Comments:  3/18 slow HR- reduce digoxin;  Orders:  -     digoxin (LANOXIN) 0.125 mg tablet; Take 1 Tab by mouth every other day. Indications: VENTRICULAR RATE CONTROL IN ATRIAL FIBRILLATION    3.  Essential hypertension, benign  Comments:  Controlled    4. Mitral and aortic regurgitation  Comments:  Mild, asymptomatic      5. Hyperlipidemia, unspecified hyperlipidemia type  Comments:  1/18 [id number], says she is taking lipitor now;  Orders:  -     LIPID PANEL; Future  -     HEPATIC FUNCTION PANEL; Future    6. Chronic diastolic congestive heart failure ([facility] [address]  Comments:  3/18 NYHA2-3  get labs from PCP few wks ago post d/c  Orders:  -     METABOLIC PANEL, BASIC; Future        Pertinent laboratory and test data reviewed and discussed with patient. See patient instructions also for other medical advice given    Medications Discontinued During This Encounter   Medication Reason    valsartan-hydroCHLOROthiazide (DIOVAN-HCT) 160-12.5 mg per tablet Not A Current Medication    amLODIPine (NORVASC) 5 mg tablet Alternate Therapy    digoxin (LANOXIN) 0.125 mg tablet Reorder       Follow-up Disposition:  Return in about 1 month (around 4/8/2018), or if symptoms worsen or fail to improve, for with Dr Chano Keane.

## 2018-03-14 DIAGNOSIS — I48.19 PERSISTENT ATRIAL FIBRILLATION (HCC): ICD-10-CM

## 2018-03-14 RX ORDER — DILTIAZEM HYDROCHLORIDE 180 MG/1
180 CAPSULE, COATED, EXTENDED RELEASE ORAL DAILY
Qty: 90 CAP | Refills: 3 | Status: SHIPPED | OUTPATIENT
Start: 2018-03-14 | End: 2019-03-13 | Stop reason: SDUPTHER

## 2018-03-16 DIAGNOSIS — I48.19 PERSISTENT ATRIAL FIBRILLATION (HCC): ICD-10-CM

## 2018-03-16 RX ORDER — DIGOXIN 125 MCG
0.12 TABLET ORAL EVERY OTHER DAY
Qty: 90 TAB | Refills: 3 | Status: SHIPPED | OUTPATIENT
Start: 2018-03-16 | End: 2018-12-03 | Stop reason: SDUPTHER

## 2018-03-16 NOTE — TELEPHONE ENCOUNTER
Patient called and stated during her last office visit that you would reduce her digoxin to 1/2 of dose she taking now every other day. Patient states she need a refilled and want you to change medication and send new prescription to the way you won't her to take it. Please advise. Requested Prescriptions     Pending Prescriptions Disp Refills    digoxin (LANOXIN) 0.125 mg tablet 90 Tab 3     Sig: Take 1 Tab by mouth every other day.  Indications: VENTRICULAR RATE CONTROL IN ATRIAL FIBRILLATION

## 2018-03-22 LAB — LDL-C, EXTERNAL: 104

## 2018-03-23 ENCOUNTER — TELEPHONE (OUTPATIENT)
Dept: CARDIOLOGY CLINIC | Age: 83
End: 2018-03-23

## 2018-03-23 DIAGNOSIS — I48.0 PAROXYSMAL ATRIAL FIBRILLATION (HCC): ICD-10-CM

## 2018-03-23 LAB
A-G RATIO,AGRAT: 1.7 RATIO (ref 1.1–2.6)
ALBUMIN SERPL-MCNC: 4 G/DL (ref 3.5–5)
ALP SERPL-CCNC: 93 U/L (ref 40–120)
ALT SERPL-CCNC: 9 U/L (ref 5–40)
ANION GAP SERPL CALC-SCNC: 15 MMOL/L
AST SERPL W P-5'-P-CCNC: 18 U/L (ref 10–37)
BILIRUB SERPL-MCNC: 0.5 MG/DL (ref 0.2–1.2)
BILIRUBIN, DIRECT,CBIL: <0.2 MG/DL (ref 0–0.3)
BUN SERPL-MCNC: 9 MG/DL (ref 6–22)
CALCIUM SERPL-MCNC: 8.8 MG/DL (ref 8.4–10.5)
CHLORIDE SERPL-SCNC: 103 MMOL/L (ref 98–110)
CHOLEST SERPL-MCNC: 194 MG/DL (ref 110–200)
CO2 SERPL-SCNC: 25 MMOL/L (ref 20–32)
CREAT SERPL-MCNC: 0.6 MG/DL (ref 0.8–1.4)
DIGOXIN SERPL-MCNC: 0.6 NG/ML (ref 0.8–2)
GFRAA, 66117: >60
GFRNA, 66118: >60
GLOBULIN,GLOB: 2.4 G/DL (ref 2–4)
GLUCOSE SERPL-MCNC: 102 MG/DL (ref 70–99)
HDLC SERPL-MCNC: 2.8 MG/DL (ref 0–5)
HDLC SERPL-MCNC: 69 MG/DL (ref 40–59)
LDLC SERPL CALC-MCNC: 104 MG/DL (ref 50–99)
POTASSIUM SERPL-SCNC: 3.8 MMOL/L (ref 3.5–5.5)
PROT SERPL-MCNC: 6.4 G/DL (ref 6.2–8.1)
SODIUM SERPL-SCNC: 143 MMOL/L (ref 133–145)
TRIGL SERPL-MCNC: 106 MG/DL (ref 40–149)
VLDLC SERPL CALC-MCNC: 21 MG/DL (ref 8–30)

## 2018-03-23 RX ORDER — ATORVASTATIN CALCIUM 10 MG/1
10 TABLET, FILM COATED ORAL DAILY
COMMUNITY
End: 2019-11-05

## 2018-03-23 NOTE — PROGRESS NOTES
Please contact patient and do the following asap    Check if compliant with cholesterol meds. Update with the dose of Lipitor that she takes. Let her know that her LDL is better than before but still slightly high. Get the names and doses of meds that patient takes and show me asap  Please reinforce diet and exercise.

## 2018-03-23 NOTE — TELEPHONE ENCOUNTER
----- Message from Bre Orr MD sent at 3/23/2018 10:19 AM EDT -----  Please contact patient and do the following asap    Check if compliant with cholesterol meds. Update with the dose of Lipitor that she takes. Let her know that her LDL is better than before but still slightly high. Get the names and doses of meds that patient takes and show me asap  Please reinforce diet and exercise.

## 2018-03-23 NOTE — TELEPHONE ENCOUNTER
Patient called to discuss medications. She is taking lipitor 10mg every evening. She is aware of proper diet and exercise to lower cholesterol and it was discussed in length to verify understanding. She voices understanding and acceptance of this advice and will call back if any further questions or concerns.

## 2018-05-21 ENCOUNTER — OFFICE VISIT (OUTPATIENT)
Dept: CARDIOLOGY CLINIC | Age: 83
End: 2018-05-21

## 2018-05-21 VITALS
BODY MASS INDEX: 25.03 KG/M2 | SYSTOLIC BLOOD PRESSURE: 139 MMHG | HEART RATE: 60 BPM | HEIGHT: 62 IN | DIASTOLIC BLOOD PRESSURE: 67 MMHG | WEIGHT: 136 LBS

## 2018-05-21 DIAGNOSIS — E78.5 HYPERLIPIDEMIA, UNSPECIFIED HYPERLIPIDEMIA TYPE: ICD-10-CM

## 2018-05-21 DIAGNOSIS — R04.0 EPISTAXIS: ICD-10-CM

## 2018-05-21 DIAGNOSIS — I10 ESSENTIAL HYPERTENSION, BENIGN: ICD-10-CM

## 2018-05-21 DIAGNOSIS — J98.4 CHRONIC LUNG DISEASE: ICD-10-CM

## 2018-05-21 DIAGNOSIS — I50.32 CHRONIC DIASTOLIC CONGESTIVE HEART FAILURE (HCC): Primary | ICD-10-CM

## 2018-05-21 DIAGNOSIS — I48.20 CHRONIC ATRIAL FIBRILLATION (HCC): ICD-10-CM

## 2018-05-21 DIAGNOSIS — I08.0 MITRAL AND AORTIC REGURGITATION: ICD-10-CM

## 2018-05-21 RX ORDER — FUROSEMIDE 20 MG/1
20 TABLET ORAL DAILY
Qty: 30 TAB | Refills: 6 | Status: SHIPPED | OUTPATIENT
Start: 2018-05-21 | End: 2019-11-05 | Stop reason: ALTCHOICE

## 2018-05-21 NOTE — PROGRESS NOTES
HISTORY OF PRESENT ILLNESS  Carl Berman is a 80 y.o. female. HPI Comments: Patient with a fib,htn,. On follow up patient denies any chest pains,sob, palpitation or other significant symptoms. Palpitations    The history is provided by the patient. This is a chronic (AFib) problem. The problem occurs rarely. Associated symptoms include lower extremity edema. Pertinent negatives include no fever, no chest pain, no claudication, no orthopnea, no PND, no abdominal pain, no nausea, no vomiting, no headaches, no dizziness, no weakness, no cough, no hemoptysis, no shortness of breath and no sputum production. Leg Swelling   The history is provided by the patient. This is a new problem. The current episode started more than 1 week ago (4-5/17). The problem occurs daily. The problem has been gradually improving (since got diuretics from urgent care). Pertinent negatives include no chest pain, no abdominal pain, no headaches and no shortness of breath. The symptoms are aggravated by standing. The symptoms are relieved by medications and sleep. Shortness of Breath   The history is provided by the patient. This is a new problem. The problem occurs intermittently. The current episode started more than 1 week ago. The problem has been resolved. Pertinent negatives include no fever, no headaches, no cough, no sputum production, no hemoptysis, no wheezing, no PND, no orthopnea, no chest pain, no vomiting, no abdominal pain, no rash, no leg swelling and no claudication. The problem's precipitants include exercise (steps). Review of Systems   Constitutional: Negative for chills and fever. HENT: Negative for nosebleeds. Eyes: Negative for blurred vision and double vision. Respiratory: Negative for cough, hemoptysis, sputum production, shortness of breath and wheezing. Cardiovascular: Positive for palpitations. Negative for chest pain, orthopnea, claudication, leg swelling and PND.    Gastrointestinal: Negative for abdominal pain, heartburn, nausea and vomiting. Musculoskeletal: Negative for myalgias. Skin: Negative for rash. Neurological: Negative for dizziness, weakness and headaches. Endo/Heme/Allergies: Does not bruise/bleed easily. Family History   Problem Relation Age of Onset    Heart Disease Neg Hx      no family history of heart disease       Past Medical History:   Diagnosis Date    Atrial fibrillation ([facility] [address]     Assessment: Maintaining S Sheng on PO amiodarone pft followed by dr Adán Pitts. tsh/lft: mildly increased tsh, t4 normal, pf stable, rpt labs pending with pmd    CAD (coronary artery disease)     High cholesterol, a- fib    Chronic airway obstruction, not elsewhere classified 3/21/2013    Chronic lung disease     Essential hypertension, benign     Stable, edema better after receiving hctz    Hypertension     Other and unspecified hyperlipidemia     On Crestor and Fish Oil       Past Surgical History:   Procedure Laterality Date    HX HYSTERECTOMY      HX SPLENECTOMY         Social History   Substance Use Topics    Smoking status: Never Smoker    Smokeless tobacco: Never Used    Alcohol use Yes      Comment: socially       Allergies   Allergen Reactions    Lisinopril Cough     *Antihypertensives *       Outpatient Prescriptions Marked as Taking for the 5/21/18 encounter (Office Visit) with Elizabeth Klein MD   Medication Sig Dispense Refill    furosemide (LASIX) 20 mg tablet Take 1 Tab by mouth daily. 30 Tab 6    atorvastatin (LIPITOR) 10 mg tablet Take 10 mg by mouth daily.  rivaroxaban (XARELTO) 20 mg tab tablet Take 1 Tab by mouth daily (with dinner). 30 Tab 5    digoxin (LANOXIN) 0.125 mg tablet Take 1 Tab by mouth every other day. Indications: VENTRICULAR RATE CONTROL IN ATRIAL FIBRILLATION 90 Tab 3    dilTIAZem CD (CARTIA XT) 180 mg ER capsule Take 1 Cap by mouth daily. 90 Cap 3    valsartan (DIOVAN) 80 mg tablet Take  by mouth daily.       benzonatate (TESSALON) 100 mg capsule Take 100 mg by mouth three (3) times daily as needed for Cough.  cyanocobalamin 1,000 mcg tablet Take 1,000 mcg by mouth daily.  naproxen sodium (ALEVE) 220 mg tablet Take 220 mg by mouth as needed.  loratadine (ALLERGY RELIEF, LORATADINE,) 10 mg dissolvable tablet Take 10 mg by mouth daily. Visit Vitals    /67    Pulse 60    Ht 5' 2\" (1.575 m)    Wt 61.7 kg (136 lb)    BMI 24.87 kg/m2       Physical Exam   Constitutional: She is oriented to person, place, and time. She appears well-developed and well-nourished. HENT:   Head: Normocephalic and atraumatic. Eyes: Conjunctivae are normal.   Neck: Neck supple. No JVD present. No tracheal deviation present. No thyromegaly present. Cardiovascular: Normal rate. An irregularly irregular rhythm present. PMI is not displaced. Exam reveals no gallop, no S3 and no decreased pulses. No murmur heard. Pulmonary/Chest: No respiratory distress. She has no wheezes. She has no rales. She exhibits no tenderness. Abdominal: Soft. There is no tenderness. Musculoskeletal: She exhibits no edema. Neurological: She is alert and oriented to person, place, and time. Skin: Skin is warm. Psychiatric: She has a normal mood and affect. Ms. Bharat Arthur has a reminder for a \"due or due soon\" health maintenance. I have asked that she contact her primary care provider for follow-up on this health maintenance.   I Have personally reviewed recent relevant labs available and discussed with patient  3/2016-tsh -high-repeat free t4  lft-normal  CARDIOLOGY STUDIES 7/1/2012 9/1/2010 10/1/2009 4/1/2009 1/1/2009 12/1/2008   Myocardial Perfusion Scan Result - - - - nl scan, EF 70% -   Echocardiogram - Complete Result - - - - - EF 70%, PAP 30   Cardioversion Result - - - successful - -   PFT's with DLCO Result dlco 73, stable see report - - - -   24 hr Holter Monitor Result - - see report - - -   Some recent data might be hidden I have discussed risk benefit and option of use of amiodarone for arrythmia. Risk of toxicity with medication are informed. Patient will require careful monitoring.  ekg  A fib-4/2017  SUMMARY:4/2017  Left ventricle: Systolic function was normal. Ejection fraction was  estimated to be 55 %. There were no regional wall motion abnormalities. Features were consistent with a pseudonormal left ventricular filling  pattern, with concomitant abnormal relaxation and increased filling  pressure (grade 2 diastolic dysfunction). Left atrium: The atrium was mildly dilated. Right atrium: The atrium was dilated. Mitral valve: There was mild annular calcification. There was mild  regurgitation. Aortic valve: There was mild regurgitation. 4/2017  holter-a fib    4/2017  pft-minimal obstructive and mild diffusion defect-no change 2012  Assessment         4/2017-recurrent-amiodarone stopped-has tried multiple other meds in past  5/2017-discussed options of ablation -wants to continue rate control startegy    I Have personally reviewed recent relevant labs available and discussed with patient  3/2018    Assessment         ICD-10-CM ICD-9-CM    1. Chronic diastolic congestive heart failure (HCC) I50.32 428.32      428.0     stable  nyha class3   2. Mitral and aortic regurgitation I08.0 396.3     stable  sob on exertion multifatcorial   3. Chronic atrial fibrillation (HCC) I48.2 427.31     stable  hr controlled  dig level 0.6   4. Essential hypertension, benign I10 401.1 furosemide (LASIX) 20 mg tablet    controlled   5. Hyperlipidemia, unspecified hyperlipidemia type E78.5 272.4     on statin  lab with pcp-last ldl 104   6. Chronic lung disease J98.4 518.89    7. Essential hypertension, benign I10 401.1 furosemide (LASIX) 20 mg tablet   8.  Epistaxis R04.0 784.7     ent evaluation     5/2018  A fib-rate control and anticoagulation  Medications Discontinued During This Encounter   Medication Reason    furosemide (LASIX) 20 mg tablet Reorder       Orders Placed This Encounter    furosemide (LASIX) 20 mg tablet     Sig: Take 1 Tab by mouth daily. Dispense:  30 Tab     Refill:  6       Follow-up Disposition:  Return in about 6 months (around 11/21/2018) for get labs from pcp.

## 2018-05-21 NOTE — LETTER
Brenna Ninodebra 
1935 5/21/2018 Dear Ruben Laureano MD 
 
I had the pleasure of evaluating  Ms. Yessi Day in office today. Below are the relevant portions of my assessment and plan of care. ICD-10-CM ICD-9-CM 1. Chronic diastolic congestive heart failure (HCC) I50.32 428.32   
  428.0   
 stable 
nyha class3 2. Mitral and aortic regurgitation I08.0 396.3   
 stable 
sob on exertion multifatcorial  
3. Chronic atrial fibrillation (HCC) I48.2 427.31   
 stable 
hr controlled 
dig level 0.6 4. Essential hypertension, benign I10 401.1 furosemide (LASIX) 20 mg tablet  
 controlled 5. Hyperlipidemia, unspecified hyperlipidemia type E78.5 272.4   
 on statin 
lab with pcp-last ldl 104  
6. Chronic lung disease J98.4 518.89   
7. Essential hypertension, benign I10 401.1 furosemide (LASIX) 20 mg tablet 8. Epistaxis R04.0 784.7   
 ent evaluation Current Outpatient Prescriptions Medication Sig Dispense Refill  furosemide (LASIX) 20 mg tablet Take 1 Tab by mouth daily. 30 Tab 6  
 atorvastatin (LIPITOR) 10 mg tablet Take 10 mg by mouth daily.  rivaroxaban (XARELTO) 20 mg tab tablet Take 1 Tab by mouth daily (with dinner). 30 Tab 5  
 digoxin (LANOXIN) 0.125 mg tablet Take 1 Tab by mouth every other day. Indications: VENTRICULAR RATE CONTROL IN ATRIAL FIBRILLATION 90 Tab 3  
 dilTIAZem CD (CARTIA XT) 180 mg ER capsule Take 1 Cap by mouth daily. 90 Cap 3  
 valsartan (DIOVAN) 80 mg tablet Take  by mouth daily.  benzonatate (TESSALON) 100 mg capsule Take 100 mg by mouth three (3) times daily as needed for Cough.  cyanocobalamin 1,000 mcg tablet Take 1,000 mcg by mouth daily.  naproxen sodium (ALEVE) 220 mg tablet Take 220 mg by mouth as needed.  loratadine (ALLERGY RELIEF, LORATADINE,) 10 mg dissolvable tablet Take 10 mg by mouth daily. Orders Placed This Encounter  furosemide (LASIX) 20 mg tablet Sig: Take 1 Tab by mouth daily. Dispense:  30 Tab Refill:  6 If you have questions, please do not hesitate to call me. I look forward to following Ms. Darshanhattei Etienne along with you. Sincerely, Queta Harris MD

## 2018-05-21 NOTE — MR AVS SNAPSHOT
[address] [facility] 
 
 
 [address] [facility] 
[phone] Patient: Edel Law MRN: [id number] :1935 Visit Information Date & Time Provider Department Dept. Phone Encounter #  
 2018  9:00 AM Yogi Gonzales MD [facility] [phone] [id number] Follow-up Instructions Return in about 6 months (around 2018) for get labs from pcp. Upcoming Health Maintenance Date Due DTaP/Tdap/Td series (1 - Tdap) 1956 ZOSTER VACCINE AGE 60> 1995 GLAUCOMA SCREENING Q2Y 2000 Bone Densitometry (Dexa) Screening 2000 Pneumococcal 65+ Low/Medium Risk (1 of 2 - PCV13) 2000 MEDICARE YEARLY EXAM 3/14/2018 Influenza Age 5 to Adult 2018 Allergies as of 2018  Review Complete On: 2018 By: Yogi Gonzales MD  
  
 Severity Noted Reaction Type Reactions Lisinopril  2013    Cough *Antihypertensives * Current Immunizations  Never Reviewed No immunizations on file. Not reviewed this visit You Were Diagnosed With   
  
 Codes Comments Chronic diastolic congestive heart failure (HCC)    -  Primary ICD-10-CM: I50.32 
ICD-9-CM: 428.32, 428.0 stable 
nyha class3 Mitral and aortic regurgitation     ICD-10-CM: I08.0 ICD-9-CM: 396.3 stable 
sob on exertion multifatcorial  
 Chronic atrial fibrillation (HCC)     ICD-10-CM: I48.2 ICD-9-CM: 427.31 stable 
hr controlled 
dig level 0.6 Essential hypertension, benign     ICD-10-CM: I10 
ICD-9-CM: 401.1 controlled Hyperlipidemia, unspecified hyperlipidemia type     ICD-10-CM: E78.5 ICD-9-CM: 272.4 on statin 
lab with pcp-last ldl 104 Chronic lung disease     ICD-10-CM: J98.4 ICD-9-CM: 518.89 Essential hypertension, benign     ICD-10-CM: I10 
ICD-9-CM: 401.1 Epistaxis     ICD-10-CM: R04.0 ICD-9-CM: 784.7 ent evaluation Vitals BP Pulse Height(growth percentile) Weight(growth percentile) BMI OB Status 139/67 60 5' 2\" (1.575 m) 136 lb (61.7 kg) 24.87 kg/m2 Hysterectomy Smoking Status Never Smoker BMI and BSA Data Body Mass Index Body Surface Area  
 24.87 kg/m 2 1.64 m 2 Preferred Pharmacy Pharmacy Name Phone [id number], [address] [place] [address] Your Updated Medication List  
  
   
This list is accurate as of 5/21/18  9:17 AM.  Always use your most recent med list.  
  
  
  
  
 ALEVE 220 mg tablet Generic drug:  naproxen sodium Take 220 mg by mouth as needed. ALLERGY RELIEF (LORATADINE) 10 mg dissolvable tablet Generic drug:  loratadine Take 10 mg by mouth daily. benzonatate 100 mg capsule Commonly known as:  TESSALON Take 100 mg by mouth three (3) times daily as needed for Cough. cyanocobalamin 1,000 mcg tablet Take 1,000 mcg by mouth daily. digoxin 0.125 mg tablet Commonly known as:  LANOXIN Take 1 Tab by mouth every other day. Indications: VENTRICULAR RATE CONTROL IN ATRIAL FIBRILLATION  
  
 dilTIAZem  mg ER capsule Commonly known as:  CARTIA XT Take 1 Cap by mouth daily. furosemide 20 mg tablet Commonly known as:  LASIX Take 1 Tab by mouth daily. LIPITOR 10 mg tablet Generic drug:  atorvastatin Take 10 mg by mouth daily. rivaroxaban 20 mg Tab tablet Commonly known as:  Mardeen Naas Take 1 Tab by mouth daily (with dinner). valsartan 80 mg tablet Commonly known as:  DIOVAN Take  by mouth daily. Prescriptions Sent to Pharmacy Refills  
 furosemide (LASIX) 20 mg tablet 6 Sig: Take 1 Tab by mouth daily. Class: Normal  
 Pharmacy: [address] [place] [address], Renan Becerra Ph #: [phone] Route: Oral  
  
Follow-up Instructions Return in about 6 months (around 11/21/2018) for get labs from pcp. Patient Instructions Requested labs from PCP. Introducing [facility]! Vamshikayleen Fields introduces [employer] patient portal. Now you can access parts of your medical record, email your doctor's office, and request medication refills online. 1. In your internet browser, go to [email or web address] 2. Click on the First Time User? Click Here link in the Sign In box. You will see the New Member Sign Up page. 3. Enter your [employer] Access Code exactly as it appears below. You will not need to use this code after youve completed the sign-up process. If you do not sign up before the expiration date, you must request a new code. · [employer] Access Code: [email or web address] Expires: 6/6/2018  1:01 PM 
 
4. Enter the last four digits of your Social Security Number (xxxx) and Date of Birth (mm/dd/yyyy) as indicated and click Submit. You will be taken to the next sign-up page. 5. Create a [employer] ID. This will be your [employer] login ID and cannot be changed, so think of one that is secure and easy to remember. 6. Create a [employer] password. You can change your password at any time. 7. Enter your Password Reset Question and Answer. This can be used at a later time if you forget your password. 8. Enter your e-mail address. You will receive e-mail notification when new information is available in [address] 9. Click Sign Up. You can now view and download portions of your medical record. 10. Click the Download Summary menu link to download a portable copy of your medical information. If you have questions, please visit the Frequently Asked Questions section of the [employer] website. Remember, [employer] is NOT to be used for urgent needs. For medical emergencies, dial 911. Now available from your iPhone and Android! Please provide this summary of care documentation to your next provider. Your primary care clinician is listed as Angie Robison.  If you have any questions after today's visit, please call [phone].

## 2018-05-21 NOTE — PROGRESS NOTES
1. Have you been to the ER, urgent care clinic since your last visit? Hospitalized since your last visit?     no    2. Have you seen or consulted any other health care providers outside of the [address] [place] [address] since your last visit? Include any pap smears or colon screening. Yes Where: pcp     3. Since your last visit, have you had any of the following symptoms? shortness of breath and dizziness.

## 2018-06-07 DIAGNOSIS — I48.0 PAROXYSMAL ATRIAL FIBRILLATION (HCC): ICD-10-CM

## 2018-09-04 ENCOUNTER — TELEPHONE (OUTPATIENT)
Dept: CARDIOLOGY CLINIC | Age: 83
End: 2018-09-04

## 2018-09-04 NOTE — TELEPHONE ENCOUNTER
Patient called stating that trhe cost of her Xarelto is going up this month it will be $125 and next month from then on will be $80 patient is unable to afford that. Patient would like to know if she could be switched to something cheaper. Please Advise.

## 2018-09-04 NOTE — TELEPHONE ENCOUNTER
I have put in the rx for Eliquis jason have to send to pharmacy to see what patients part is, can you please sign.  Thanks

## 2018-09-04 NOTE — TELEPHONE ENCOUNTER
Check if she can afford Eliquis if not will have to start giving her Coumadin 5 mg a day and check INR and set up for INR follow-up

## 2018-09-04 NOTE — TELEPHONE ENCOUNTER
Patient called in concern to taking Eliquis versus Coumadin. Stating that the Eliquis is becoming very expensive to buy and would like to try Coumadin instead; please advise.

## 2018-09-05 NOTE — TELEPHONE ENCOUNTER
Eliquis is too expensive also.  Will send over coumadin and patient will come in office on 9/10/18 for Inr.

## 2018-09-06 RX ORDER — WARFARIN SODIUM 5 MG/1
5 TABLET ORAL DAILY
Qty: 30 TAB | Refills: 6 | Status: SHIPPED | OUTPATIENT
Start: 2018-09-06 | End: 2018-10-15 | Stop reason: CLARIF

## 2018-09-10 ENCOUNTER — TELEPHONE (OUTPATIENT)
Dept: CARDIOLOGY CLINIC | Age: 83
End: 2018-09-10

## 2018-09-10 DIAGNOSIS — I48.91 ATRIAL FIBRILLATION, UNSPECIFIED TYPE (HCC): Primary | ICD-10-CM

## 2018-09-10 NOTE — TELEPHONE ENCOUNTER
Patient was called and made aware that it was okay to have coumadin tested at PCP and faxed to our office per Dr. Saul Fung.

## 2018-09-10 NOTE — TELEPHONE ENCOUNTER
----- Message from Marlena Arana MD sent at 9/10/2018 12:31 PM EDT -----  Regarding: FW: COUMADIN TEST  Contact: [phone]  Okay for that  ----- Message -----     From: Emigdio Obando LPN     Sent: 7/09/0075  10:33 AM       To: Marlena Arana MD  Subject: FW: COUMADIN TEST                                    ----- Message -----     From: Magdaleno Goodpasture     Sent: 9/10/2018  10:10 AM       To: Emigdio Obando LPN  Subject: COUMADIN TEST                                    Patient called, would it be okay to have her coumadin tested at primary care office and have them fax it here?

## 2018-10-15 ENCOUNTER — TELEPHONE (OUTPATIENT)
Dept: CARDIOLOGY CLINIC | Age: 83
End: 2018-10-15

## 2018-10-15 DIAGNOSIS — I48.91 ATRIAL FIBRILLATION, UNSPECIFIED TYPE (HCC): Primary | ICD-10-CM

## 2018-10-15 NOTE — TELEPHONE ENCOUNTER
Patient stated she went to pcp to check inr and it was 1.5 she stated she can not get it regulated and she do want to stay on it anymore and would like to go back to [facility] . She do not want to stay on warfarin.  Please advise

## 2018-11-06 ENCOUNTER — OFFICE VISIT (OUTPATIENT)
Dept: CARDIOLOGY CLINIC | Age: 83
End: 2018-11-06

## 2018-11-06 VITALS
SYSTOLIC BLOOD PRESSURE: 156 MMHG | HEIGHT: 62 IN | BODY MASS INDEX: 24.84 KG/M2 | DIASTOLIC BLOOD PRESSURE: 75 MMHG | HEART RATE: 64 BPM | WEIGHT: 135 LBS

## 2018-11-06 DIAGNOSIS — E78.5 HYPERLIPIDEMIA, UNSPECIFIED HYPERLIPIDEMIA TYPE: ICD-10-CM

## 2018-11-06 DIAGNOSIS — I08.0 MITRAL AND AORTIC REGURGITATION: ICD-10-CM

## 2018-11-06 DIAGNOSIS — I48.20 CHRONIC ATRIAL FIBRILLATION (HCC): ICD-10-CM

## 2018-11-06 DIAGNOSIS — I50.32 CHRONIC DIASTOLIC CONGESTIVE HEART FAILURE (HCC): Primary | ICD-10-CM

## 2018-11-06 DIAGNOSIS — I10 ESSENTIAL HYPERTENSION, BENIGN: ICD-10-CM

## 2018-11-06 NOTE — LETTER
Addy Ralph 
1935 11/6/2018 Dear Jun Trujillo MD 
 
I had the pleasure of evaluating  Ms. Princess Orosco in office today. Below are the relevant portions of my assessment and plan of care. ICD-10-CM ICD-9-CM 1. Chronic diastolic congestive heart failure (HCC) I50.32 428.32   
  428.0 Stable. Class II continue treatment 2. Mitral and aortic regurgitation I08.0 396.3 Stable. Normal ejection fraction 3. Chronic atrial fibrillation (HCC) I48.2 427.31 Stable rate control. Continue anticoagulation 4. Essential hypertension, benign I10 401.1   
 mildly elevated 
monitor 5. Hyperlipidemia, unspecified hyperlipidemia type E78.5 272.4 Stable lab with PCP Current Outpatient Medications Medication Sig Dispense Refill  rivaroxaban (XARELTO) 20 mg tab tablet Take 1 Tab by mouth daily. 30 Tab 3  furosemide (LASIX) 20 mg tablet Take 1 Tab by mouth daily. 30 Tab 6  
 atorvastatin (LIPITOR) 10 mg tablet Take 10 mg by mouth daily.  digoxin (LANOXIN) 0.125 mg tablet Take 1 Tab by mouth every other day. Indications: VENTRICULAR RATE CONTROL IN ATRIAL FIBRILLATION 90 Tab 3  
 dilTIAZem CD (CARTIA XT) 180 mg ER capsule Take 1 Cap by mouth daily. 90 Cap 3  
 valsartan (DIOVAN) 80 mg tablet Take  by mouth daily.  benzonatate (TESSALON) 100 mg capsule Take 100 mg by mouth three (3) times daily as needed for Cough.  cyanocobalamin 1,000 mcg tablet Take 1,000 mcg by mouth daily.  naproxen sodium (ALEVE) 220 mg tablet Take 220 mg by mouth as needed.  loratadine (ALLERGY RELIEF, LORATADINE,) 10 mg dissolvable tablet Take 10 mg by mouth daily. No orders of the defined types were placed in this encounter. If you have questions, please do not hesitate to call me. I look forward to following Ms. Princess Orosco along with you. Sincerely, Davey Colin MD

## 2018-11-06 NOTE — PROGRESS NOTES
1. Have you been to the ER, urgent care clinic since your last visit? Hospitalized since your last visit? no 
 
2. Have you seen or consulted any other health care providers outside of the [address] [place] [address] since your last visit? Include any pap smears or colon screening. Yes pcp

## 2018-11-06 NOTE — PROGRESS NOTES
HISTORY OF PRESENT ILLNESS Rachel Rojas is a 80 y.o. female. Patient with a fib,htn,. On follow up patient denies any chest pains,sob, palpitation or other significant symptoms. Palpitations The history is provided by the patient. This is a chronic (AFib) problem. The problem occurs rarely. Associated symptoms include lower extremity edema. Pertinent negatives include no fever, no chest pain, no claudication, no orthopnea, no PND, no abdominal pain, no nausea, no vomiting, no headaches, no dizziness, no weakness, no cough, no hemoptysis, no shortness of breath and no sputum production. Leg Swelling The history is provided by the patient. This is a new problem. The current episode started more than 1 week ago (4-5/17). The problem occurs daily. The problem has been gradually improving (since got diuretics from urgent care). Pertinent negatives include no chest pain, no abdominal pain, no headaches and no shortness of breath. The symptoms are aggravated by standing. The symptoms are relieved by medications and sleep. Shortness of Breath The history is provided by the patient. This is a new problem. The problem occurs intermittently. The current episode started more than 1 week ago. The problem has been resolved. Pertinent negatives include no fever, no headaches, no cough, no sputum production, no hemoptysis, no wheezing, no PND, no orthopnea, no chest pain, no vomiting, no abdominal pain, no rash, no leg swelling and no claudication. The problem's precipitants include exercise (steps). Review of Systems Constitutional: Negative for chills and fever. HENT: Negative for nosebleeds. Eyes: Negative for blurred vision and double vision. Respiratory: Negative for cough, hemoptysis, sputum production, shortness of breath and wheezing. Cardiovascular: Positive for palpitations. Negative for chest pain, orthopnea, claudication, leg swelling and PND. Gastrointestinal: Negative for abdominal pain, heartburn, nausea and vomiting. Musculoskeletal: Negative for myalgias. Skin: Negative for rash. Neurological: Negative for dizziness, weakness and headaches. Endo/Heme/Allergies: Does not bruise/bleed easily. Family History Problem Relation Age of Onset  Heart Disease Neg Hx   
     no family history of heart disease Past Medical History:  
Diagnosis Date  Atrial fibrillation ([address] Assessment: Maintaining S Sheng on PO amiodarone pft followed by dr Shari Shook. tsh/lft: mildly increased tsh, t4 normal, pf stable, rpt labs pending with pmd  
 CAD (coronary artery disease) High cholesterol, a- fib  Chronic airway obstruction, not elsewhere classified 3/21/2013  Chronic lung disease  Essential hypertension, benign Stable, edema better after receiving hctz  Hypertension  Other and unspecified hyperlipidemia On Crestor and Fish Oil Past Surgical History:  
Procedure Laterality Date  HX HYSTERECTOMY  HX SPLENECTOMY Social History Tobacco Use  Smoking status: Never Smoker  Smokeless tobacco: Never Used Substance Use Topics  Alcohol use: Yes Comment: socially Allergies Allergen Reactions  Lisinopril Cough *Antihypertensives * Visit Vitals /75 Pulse 64 Ht 5' 2\" (1.575 m) Wt 61.2 kg (135 lb) BMI 24.69 kg/m² Physical Exam  
Constitutional: She is oriented to person, place, and time. She appears well-developed and well-nourished. HENT:  
Head: Normocephalic and atraumatic. Eyes: Conjunctivae are normal.  
Neck: Neck supple. No JVD present. No tracheal deviation present. No thyromegaly present. Cardiovascular: Normal rate. An irregularly irregular rhythm present. PMI is not displaced. Exam reveals no gallop, no S3 and no decreased pulses. No murmur heard. Pulmonary/Chest: No respiratory distress. She has no wheezes.  She has no rales. She exhibits no tenderness. Abdominal: Soft. There is no tenderness. Musculoskeletal: She exhibits no edema. Neurological: She is alert and oriented to person, place, and time. Skin: Skin is warm. Psychiatric: She has a normal mood and affect. Ms. Aneta Mercer has a reminder for a \"due or due soon\" health maintenance. I have asked that she contact her primary care provider for follow-up on this health maintenance. I Have personally reviewed recent relevant labs available and discussed with patient 3/2016-tsh -high-repeat free t4 
lft-normal 
No flowsheet data found. I have discussed risk benefit and option of use of amiodarone for arrythmia. Risk of toxicity with medication are informed. Patient will require careful monitoring. 
ekg A fib-4/2017 SUMMARY:4/2017 Left ventricle: Systolic function was normal. Ejection fraction was 
estimated to be 55 %. There were no regional wall motion abnormalities. Features were consistent with a pseudonormal left ventricular filling 
pattern, with concomitant abnormal relaxation and increased filling 
pressure (grade 2 diastolic dysfunction). Left atrium: The atrium was mildly dilated. Right atrium: The atrium was dilated. Mitral valve: There was mild annular calcification. There was mild 
regurgitation. Aortic valve: There was mild regurgitation. 4/2017 
holter-a fib 4/2017 pft-minimal obstructive and mild diffusion defect-no change 2012 I Have personally reviewed recent relevant labs available and discussed with patient 3/2018 Assessment ICD-10-CM ICD-9-CM 1. Chronic diastolic congestive heart failure (HCC) I50.32 428.32   
  428.0 Stable. Class II continue treatment 2. Mitral and aortic regurgitation I08.0 396.3 Stable. Normal ejection fraction 3. Chronic atrial fibrillation (HCC) I48.2 427.31 Stable rate control. Continue anticoagulation 4. Essential hypertension, benign I10 401.1 mildly elevated 
monitor 5. Hyperlipidemia, unspecified hyperlipidemia type E78.5 272.4 Stable lab with PCP  
4/2017-recurrent A. fib-amiodarone stopped-has tried multiple other meds in past 
5/2017-discussed options of ablation -wants to continue rate control startegy 5/2018 A fib-rate control and anticoagulation There are no discontinued medications. No orders of the defined types were placed in this encounter. Follow-up Disposition: 
Return in about 6 months (around 5/6/2019).

## 2018-12-03 ENCOUNTER — TELEPHONE (OUTPATIENT)
Dept: CARDIOLOGY CLINIC | Age: 83
End: 2018-12-03

## 2018-12-03 DIAGNOSIS — I48.19 PERSISTENT ATRIAL FIBRILLATION (HCC): ICD-10-CM

## 2018-12-03 RX ORDER — DIGOXIN 125 UG/1
TABLET ORAL
Qty: 45 TAB | Refills: 0 | Status: SHIPPED | OUTPATIENT
Start: 2018-12-03 | End: 2018-12-09 | Stop reason: SDUPTHER

## 2018-12-03 NOTE — TELEPHONE ENCOUNTER
Patient would like to know is she have a lowe dose of digoxin 0.125mg because she is taking it every other day and she stated she has to has to write on the bottle to remember to take it every other day. She would like to know if she can have a lower dose so she can take it everyday. Please advise.

## 2018-12-09 DIAGNOSIS — I48.19 PERSISTENT ATRIAL FIBRILLATION (HCC): ICD-10-CM

## 2018-12-10 RX ORDER — DIGOXIN 125 UG/1
TABLET ORAL
Qty: 45 TAB | Refills: 0 | Status: SHIPPED | OUTPATIENT
Start: 2018-12-10 | End: 2019-05-14 | Stop reason: ALTCHOICE

## 2019-03-02 DIAGNOSIS — I48.19 PERSISTENT ATRIAL FIBRILLATION (HCC): ICD-10-CM

## 2019-03-04 RX ORDER — DIGOXIN 125 UG/1
TABLET ORAL
Qty: 45 TAB | Refills: 0 | Status: SHIPPED | OUTPATIENT
Start: 2019-03-04 | End: 2019-08-23 | Stop reason: SDUPTHER

## 2019-03-13 RX ORDER — DILTIAZEM HYDROCHLORIDE 180 MG/1
CAPSULE, EXTENDED RELEASE ORAL
Qty: 90 CAP | Refills: 1 | Status: SHIPPED | OUTPATIENT
Start: 2019-03-13 | End: 2019-06-20 | Stop reason: SDUPTHER

## 2019-04-09 DIAGNOSIS — I48.91 ATRIAL FIBRILLATION, UNSPECIFIED TYPE (HCC): ICD-10-CM

## 2019-04-09 RX ORDER — RIVAROXABAN 20 MG/1
TABLET, FILM COATED ORAL
Qty: 30 TAB | Refills: 6 | Status: SHIPPED | OUTPATIENT
Start: 2019-04-09 | End: 2019-11-11 | Stop reason: SDUPTHER

## 2019-05-14 ENCOUNTER — OFFICE VISIT (OUTPATIENT)
Dept: CARDIOLOGY CLINIC | Age: 84
End: 2019-05-14

## 2019-05-14 VITALS
BODY MASS INDEX: 25.4 KG/M2 | WEIGHT: 138 LBS | SYSTOLIC BLOOD PRESSURE: 128 MMHG | HEIGHT: 62 IN | HEART RATE: 76 BPM | DIASTOLIC BLOOD PRESSURE: 76 MMHG

## 2019-05-14 DIAGNOSIS — I08.0 MITRAL AND AORTIC REGURGITATION: ICD-10-CM

## 2019-05-14 DIAGNOSIS — Z79.01 ANTICOAGULANT LONG-TERM USE: ICD-10-CM

## 2019-05-14 DIAGNOSIS — I50.32 CHRONIC DIASTOLIC CONGESTIVE HEART FAILURE (HCC): Primary | ICD-10-CM

## 2019-05-14 DIAGNOSIS — I48.20 CHRONIC ATRIAL FIBRILLATION (HCC): ICD-10-CM

## 2019-05-14 DIAGNOSIS — I10 ESSENTIAL HYPERTENSION, BENIGN: ICD-10-CM

## 2019-05-14 NOTE — PATIENT INSTRUCTIONS
My[employer] Activation    Thank you for requesting access to [email or web address]. Please follow the instructions below to securely access and download your online medical record. [email or web address] allows you to send messages to your doctor, view your test results, renew your prescriptions, schedule appointments, and more. How Do I Sign Up? 1. In your internet browser, go to [email or web address]. 2. Click on the First Time User? Click Here link in the Sign In box. You will see the New Member Sign Up page. 3. Enter your [email or web address] Access Code exactly as it appears below. You will not need to use this code after youve completed the sign-up process. If you do not sign up before the expiration date, you must request a new code. [email or web address] Access Code: [email or web address]  Expires: 2019  8:27 AM (This is the date your [email or web address] access code will )    4. Enter the last four digits of your Social Security Number (xxxx) and Date of Birth (mm/dd/yyyy) as indicated and click Submit. You will be taken to the next sign-up page. 5. Create a [email or web address] ID. This will be your [email or web address] login ID and cannot be changed, so think of one that is secure and easy to remember. 6. Create a [email or web address] password. You can change your password at any time. 7. Enter your Password Reset Question and Answer. This can be used at a later time if you forget your password. 8. Enter your e-mail address. You will receive e-mail notification when new information is available in [address] 9. Click Sign Up. You can now view and download portions of your medical record. 10. Click the Download Summary menu link to download a portable copy of your medical information. Additional Information    If you have questions, please visit the Frequently Asked Questions section of the [email or web address] website at [email or web address] Remember, [email or web address] is NOT to be used for urgent needs. For medical emergencies, dial 911.       [email or web address] Activation    Thank you for requesting access to [employer]. Please follow the instructions below to securely access and download your online medical record. [employer] allows you to send messages to your doctor, view your test results, renew your prescriptions, schedule appointments, and more. How Do I Sign Up?    11. In your internet browser, go to [email or web address]. 12. Click on the First Time User? Click Here link in the Sign In box. You will see the New Member Sign Up page. 15. Enter your [employer] Access Code exactly as it appears below. You will not need to use this code after youve completed the sign-up process. If you do not sign up before the expiration date, you must request a new code. [employer] Access Code: [email or web address]  Expires: 2019  8:27 AM (This is the date your [employer] access code will )    14. Enter the last four digits of your Social Security Number (xxxx) and Date of Birth (mm/dd/yyyy) as indicated and click Submit. You will be taken to the next sign-up page. 15. Create a [employer] ID. This will be your [employer] login ID and cannot be changed, so think of one that is secure and easy to remember. 12. Create a [employer] password. You can change your password at any time. 16. Enter your Password Reset Question and Answer. This can be used at a later time if you forget your password. 25. Enter your e-mail address. You will receive e-mail notification when new information is available in [address] 19. Click Sign Up. You can now view and download portions of your medical record. 20. Click the Download Summary menu link to download a portable copy of your medical information. Additional Information    If you have questions, please visit the Frequently Asked Questions section of the [employer] website at [email or web address] Remember, [employer] is NOT to be used for urgent needs. For medical emergencies, dial 911.

## 2019-05-14 NOTE — PROGRESS NOTES
HISTORY OF PRESENT ILLNESS  Bharath Jimenez is a 80 y.o. female. Patient with a fib,htn,. On follow up patient denies any chest pains,sob, palpitation or other significant symptoms. Palpitations    The history is provided by the patient. This is a chronic (AFib) problem. The problem occurs rarely. Associated symptoms include lower extremity edema. Pertinent negatives include no fever, no chest pain, no claudication, no orthopnea, no PND, no abdominal pain, no nausea, no vomiting, no headaches, no dizziness, no weakness, no cough, no hemoptysis, no shortness of breath and no sputum production. Leg Swelling   The history is provided by the patient. This is a new problem. The current episode started more than 1 week ago (4-5/17). The problem occurs daily. The problem has been gradually improving (since got diuretics from urgent care). Pertinent negatives include no chest pain, no abdominal pain, no headaches and no shortness of breath. The symptoms are aggravated by standing. The symptoms are relieved by medications and sleep. Shortness of Breath   The history is provided by the patient. This is a new problem. The problem occurs intermittently. The current episode started more than 1 week ago. The problem has been resolved. Pertinent negatives include no fever, no headaches, no cough, no sputum production, no hemoptysis, no wheezing, no PND, no orthopnea, no chest pain, no vomiting, no abdominal pain, no rash, no leg swelling and no claudication. The problem's precipitants include exercise (steps). Review of Systems   Constitutional: Negative for chills and fever. HENT: Negative for nosebleeds. Eyes: Negative for blurred vision and double vision. Respiratory: Negative for cough, hemoptysis, sputum production, shortness of breath and wheezing. Cardiovascular: Positive for palpitations. Negative for chest pain, orthopnea, claudication, leg swelling and PND.    Gastrointestinal: Negative for abdominal pain, heartburn, nausea and vomiting. Musculoskeletal: Negative for myalgias. Skin: Negative for rash. Neurological: Negative for dizziness, weakness and headaches. Endo/Heme/Allergies: Does not bruise/bleed easily. Family History   Problem Relation Age of Onset    Heart Disease Neg Hx         no family history of heart disease       Past Medical History:   Diagnosis Date    Atrial fibrillation ([facility] [address]     Assessment: Maintaining S Sheng on PO amiodarone pft followed by dr Aleks Dyer. tsh/lft: mildly increased tsh, t4 normal, pf stable, rpt labs pending with pmd    CAD (coronary artery disease)     High cholesterol, a- fib    Chronic airway obstruction, not elsewhere classified 3/21/2013    Chronic lung disease     Essential hypertension, benign     Stable, edema better after receiving hctz    Hypertension     Other and unspecified hyperlipidemia     On Crestor and Fish Oil       Past Surgical History:   Procedure Laterality Date    HX HYSTERECTOMY      HX SPLENECTOMY         Social History     Tobacco Use    Smoking status: Never Smoker    Smokeless tobacco: Never Used   Substance Use Topics    Alcohol use: Yes     Comment: socially       Allergies   Allergen Reactions    Lisinopril Cough     *Antihypertensives *           Visit Vitals  /76   Pulse 76   Ht 5' 2\" (1.575 m)   Wt 62.6 kg (138 lb)   BMI 25.24 kg/m²       Physical Exam   Constitutional: She is oriented to person, place, and time. She appears well-developed and well-nourished. HENT:   Head: Normocephalic and atraumatic. Eyes: Conjunctivae are normal.   Neck: Neck supple. No JVD present. No tracheal deviation present. No thyromegaly present. Cardiovascular: Normal rate. An irregularly irregular rhythm present. PMI is not displaced. Exam reveals no gallop, no S3 and no decreased pulses. No murmur heard. Pulmonary/Chest: No respiratory distress. She has no wheezes. She has no rales. She exhibits no tenderness. Abdominal: Soft. There is no tenderness. Musculoskeletal: She exhibits no edema. Neurological: She is alert and oriented to person, place, and time. Skin: Skin is warm. Psychiatric: She has a normal mood and affect. Ms. Goran Ortega has a reminder for a \"due or due soon\" health maintenance. I have asked that she contact her primary care provider for follow-up on this health maintenance. I Have personally reviewed recent relevant labs available and discussed with patient  3/2016-tsh -high-repeat free t4  lft-normal  No flowsheet data found. I have discussed risk benefit and option of use of amiodarone for arrythmia. Risk of toxicity with medication are informed. Patient will require careful monitoring.  ekg  A fib-4/2017  SUMMARY:4/2017  Left ventricle: Systolic function was normal. Ejection fraction was  estimated to be 55 %. There were no regional wall motion abnormalities. Features were consistent with a pseudonormal left ventricular filling  pattern, with concomitant abnormal relaxation and increased filling  pressure (grade 2 diastolic dysfunction). Left atrium: The atrium was mildly dilated. Right atrium: The atrium was dilated. Mitral valve: There was mild annular calcification. There was mild  regurgitation. Aortic valve: There was mild regurgitation. 4/2017  holter-a fib    4/2017  pft-minimal obstructive and mild diffusion defect-no change 2012            I Have personally reviewed recent relevant labs available and discussed with patient  3/2018  3/2019-digoxin-0.9  Assessment         ICD-10-CM ICD-9-CM    1. Chronic diastolic congestive heart failure (HCC) I50.32 428.32      428.0     Stable class III multifactorial continue treatment   2. Mitral and aortic regurgitation I08.0 396.3     Stable normal ejection fraction monitor   3. Chronic atrial fibrillation (HCC) I48.2 427.31     Stable continue current treatment rate control   4.  Essential hypertension, benign I10 401.1     Stable continue treatment   5. Anticoagulant long-term use Z79.01 V58.61     xarelto for af   4/2017-recurrent A. fib-amiodarone stopped-has tried multiple other meds in past  5/2017-discussed options of ablation -wants to continue rate control startegy  5/2018  A fib-rate control and anticoagulation      Medications Discontinued During This Encounter   Medication Reason    DIGOX 125 mcg tablet Therapy Completed       No orders of the defined types were placed in this encounter. Follow-up and Dispositions    · Return in about 6 months (around 11/14/2019).

## 2019-05-14 NOTE — PROGRESS NOTES
1. Have you been to the ER, urgent care clinic since your last visit? Hospitalized since your last visit?     no2. Have you seen or consulted any other health care providers outside of the [address] [place] [address] since your last visit? Include any pap smears or colon screening. Yes Where: pcp     3. Since your last visit, have you had any of the following symptoms? chest pains and shortness of breath.

## 2019-06-20 RX ORDER — DILTIAZEM HYDROCHLORIDE 180 MG/1
CAPSULE, COATED, EXTENDED RELEASE ORAL
Qty: 90 CAP | Refills: 3 | Status: SHIPPED | OUTPATIENT
Start: 2019-06-20 | End: 2022-04-19

## 2019-08-23 DIAGNOSIS — I48.19 PERSISTENT ATRIAL FIBRILLATION (HCC): ICD-10-CM

## 2019-08-23 RX ORDER — DIGOXIN 125 UG/1
TABLET ORAL
Qty: 45 TAB | Refills: 0 | Status: SHIPPED | OUTPATIENT
Start: 2019-08-23 | End: 2019-11-21 | Stop reason: SDUPTHER

## 2019-11-05 ENCOUNTER — OFFICE VISIT (OUTPATIENT)
Dept: CARDIOLOGY CLINIC | Age: 84
End: 2019-11-05

## 2019-11-05 VITALS
DIASTOLIC BLOOD PRESSURE: 60 MMHG | HEIGHT: 62 IN | SYSTOLIC BLOOD PRESSURE: 148 MMHG | BODY MASS INDEX: 23.92 KG/M2 | HEART RATE: 41 BPM | WEIGHT: 130 LBS

## 2019-11-05 DIAGNOSIS — I50.32 CHRONIC DIASTOLIC CONGESTIVE HEART FAILURE (HCC): Primary | ICD-10-CM

## 2019-11-05 DIAGNOSIS — I08.0 MITRAL AND AORTIC REGURGITATION: ICD-10-CM

## 2019-11-05 DIAGNOSIS — Z79.01 ANTICOAGULANT LONG-TERM USE: ICD-10-CM

## 2019-11-05 DIAGNOSIS — I48.20 CHRONIC ATRIAL FIBRILLATION (HCC): ICD-10-CM

## 2019-11-05 DIAGNOSIS — I10 ESSENTIAL HYPERTENSION, BENIGN: ICD-10-CM

## 2019-11-05 DIAGNOSIS — E78.5 HYPERLIPIDEMIA, UNSPECIFIED HYPERLIPIDEMIA TYPE: ICD-10-CM

## 2019-11-05 RX ORDER — ATORVASTATIN CALCIUM 20 MG/1
20 TABLET, FILM COATED ORAL DAILY
Qty: 90 TAB | Refills: 3 | Status: SHIPPED | OUTPATIENT
Start: 2019-11-05 | End: 2020-10-29

## 2019-11-05 NOTE — PATIENT INSTRUCTIONS
[employer] Activation Thank you for requesting access to [employer]. Please follow the instructions below to securely access and download your online medical record. [employer] allows you to send messages to your doctor, view your test results, renew your prescriptions, schedule appointments, and more. How Do I Sign Up? 1. In your internet browser, go to [email or web address]. 2. Click on the First Time User? Click Here link in the Sign In box. You will see the New Member Sign Up page. 3. Enter your [employer] Access Code exactly as it appears below. You will not need to use this code after youve completed the sign-up process. If you do not sign up before the expiration date, you must request a new code. [employer] Access Code: [email or web address] Expires: 2019  9:59 AM (This is the date your [employer] access code will ) 4. Enter the last four digits of your Social Security Number (xxxx) and Date of Birth (mm/dd/yyyy) as indicated and click Submit. You will be taken to the next sign-up page. 5. Create a [employer] ID. This will be your [employer] login ID and cannot be changed, so think of one that is secure and easy to remember. 6. Create a [employer] password. You can change your password at any time. 7. Enter your Password Reset Question and Answer. This can be used at a later time if you forget your password. 8. Enter your e-mail address. You will receive e-mail notification when new information is available in [address] 9. Click Sign Up. You can now view and download portions of your medical record. 10. Click the Download Summary menu link to download a portable copy of your medical information. Additional Information If you have questions, please visit the Frequently Asked Questions section of the [employer] website at [email or web address] Remember, [employer] is NOT to be used for urgent needs. For medical emergencies, dial 911.

## 2019-11-05 NOTE — PROGRESS NOTES
HISTORY OF PRESENT ILLNESS  Pablo Dey is a 80 y.o. female. Patient with a fib,htn,. On follow up patient denies any chest pains,sob, palpitation or other significant symptoms. Palpitations    The history is provided by the patient. This is a chronic (AFib) problem. The problem occurs rarely. Associated symptoms include lower extremity edema. Pertinent negatives include no fever, no chest pain, no claudication, no orthopnea, no PND, no abdominal pain, no nausea, no vomiting, no headaches, no dizziness, no weakness, no cough, no hemoptysis, no shortness of breath and no sputum production. Leg Swelling   The history is provided by the patient. This is a recurrent problem. Episode onset: 4-5/17. The problem occurs rarely. The problem has been resolved (since got diuretics from urgent care). Pertinent negatives include no chest pain, no abdominal pain, no headaches and no shortness of breath. The symptoms are aggravated by standing. The symptoms are relieved by sleep. Shortness of Breath   The history is provided by the patient. This is a recurrent problem. The problem occurs intermittently. The current episode started more than 1 week ago. The problem has been resolved. Pertinent negatives include no fever, no headaches, no cough, no sputum production, no hemoptysis, no wheezing, no PND, no orthopnea, no chest pain, no vomiting, no abdominal pain, no rash, no leg swelling and no claudication. The problem's precipitants include exercise (steps). Review of Systems   Constitutional: Negative for chills and fever. HENT: Negative for nosebleeds. Eyes: Negative for blurred vision and double vision. Respiratory: Negative for cough, hemoptysis, sputum production, shortness of breath and wheezing. Cardiovascular: Positive for palpitations. Negative for chest pain, orthopnea, claudication, leg swelling and PND. Gastrointestinal: Negative for abdominal pain, heartburn, nausea and vomiting. Musculoskeletal: Negative for myalgias. Skin: Negative for rash. Neurological: Negative for dizziness, weakness and headaches. Endo/Heme/Allergies: Does not bruise/bleed easily. Family History   Problem Relation Age of Onset    Heart Disease Neg Hx         no family history of heart disease       Past Medical History:   Diagnosis Date    Atrial fibrillation ([facility] [address]     Assessment: Maintaining S Sheng on PO amiodarone pft followed by dr Angie Hall. tsh/lft: mildly increased tsh, t4 normal, pf stable, rpt labs pending with pmd    CAD (coronary artery disease)     High cholesterol, a- fib    Chronic airway obstruction, not elsewhere classified 3/21/2013    Chronic lung disease     Essential hypertension, benign     Stable, edema better after receiving hctz    Hypertension     Other and unspecified hyperlipidemia     On Crestor and Fish Oil       Past Surgical History:   Procedure Laterality Date    HX HYSTERECTOMY      HX SPLENECTOMY         Social History     Tobacco Use    Smoking status: Never Smoker    Smokeless tobacco: Never Used   Substance Use Topics    Alcohol use: Yes     Comment: socially       Allergies   Allergen Reactions    Lisinopril Cough     *Antihypertensives *           Visit Vitals  /60   Pulse (!) 41   Ht 5' 2\" (1.575 m)   Wt 59 kg (130 lb)   BMI 23.78 kg/m²       Physical Exam   Constitutional: She is oriented to person, place, and time. She appears well-developed and well-nourished. HENT:   Head: Normocephalic and atraumatic. Eyes: Conjunctivae are normal.   Neck: Neck supple. No JVD present. No tracheal deviation present. No thyromegaly present. Cardiovascular: Normal rate. An irregularly irregular rhythm present. PMI is not displaced. Exam reveals no gallop, no S3 and no decreased pulses. No murmur heard. Pulmonary/Chest: No respiratory distress. She has no wheezes. She has no rales. She exhibits no tenderness. Abdominal: Soft. There is no tenderness. Musculoskeletal: She exhibits no edema. Neurological: She is alert and oriented to person, place, and time. Skin: Skin is warm. Psychiatric: She has a normal mood and affect. Ms. Dominick Gonzalez has a reminder for a \"due or due soon\" health maintenance. I have asked that she contact her primary care provider for follow-up on this health maintenance. I Have personally reviewed recent relevant labs available and discussed with patient  3/2016-tsh -high-repeat free t4  lft-normal  No flowsheet data found. I have discussed risk benefit and option of use of amiodarone for arrythmia. Risk of toxicity with medication are informed. Patient will require careful monitoring.  ekg  A fib-4/2017  SUMMARY:4/2017  Left ventricle: Systolic function was normal. Ejection fraction was  estimated to be 55 %. There were no regional wall motion abnormalities. Features were consistent with a pseudonormal left ventricular filling  pattern, with concomitant abnormal relaxation and increased filling  pressure (grade 2 diastolic dysfunction). Left atrium: The atrium was mildly dilated. Right atrium: The atrium was dilated. Mitral valve: There was mild annular calcification. There was mild  regurgitation. Aortic valve: There was mild regurgitation. 4/2017  holter-a fib    4/2017  pft-minimal obstructive and mild diffusion defect-no change 2012            I Have personally reviewed recent relevant labs available and discussed with patient  3/2018  3/2019-digoxin-0.9  Assessment         ICD-10-CM ICD-9-CM    1. Chronic diastolic congestive heart failure (HCC) I50.32 428.32      428.0     Stable clinically compensated. Class III continue current medical management   2. Mitral and aortic regurgitation I08.0 396.3     Stable on treatment monitor   3. Chronic atrial fibrillation I48.20 427.31     Continue with rate control and anticoagulation. Stable   4. Anticoagulant long-term use Z79.01 V58.61     For A. fib.    5. Essential hypertension, benign I10 401.1     Stable on treatment monitor   6. Hyperlipidemia, unspecified hyperlipidemia type E78.5 272.4     Continue current treatment lab with PCP   4/2017-recurrent A. fib-amiodarone stopped-has tried multiple other meds in past  5/2017-discussed options of ablation -wants to continue rate control startegy  5/2018  A fib-rate control and anticoagulation  11/2019  Cardiac status stable. Lipitor increased to 20 mg as LDL was mildly elevated. Digoxin level was 0.6.  9/2019 continue current therapy  Medications Discontinued During This Encounter   Medication Reason    cyanocobalamin 1,000 mcg tablet Therapy Completed    furosemide (LASIX) 20 mg tablet Discontinued by Another Clinician       No orders of the defined types were placed in this encounter. Follow-up and Dispositions    · Return in about 6 months (around 5/5/2020).

## 2019-11-11 DIAGNOSIS — I48.91 ATRIAL FIBRILLATION, UNSPECIFIED TYPE (HCC): ICD-10-CM

## 2019-11-11 RX ORDER — RIVAROXABAN 20 MG/1
TABLET, FILM COATED ORAL
Qty: 30 TAB | Refills: 5 | Status: SHIPPED | OUTPATIENT
Start: 2019-11-11 | End: 2020-05-08

## 2019-11-21 DIAGNOSIS — I48.19 PERSISTENT ATRIAL FIBRILLATION (HCC): ICD-10-CM

## 2019-11-21 RX ORDER — DIGOXIN 125 UG/1
TABLET ORAL
Qty: 45 TAB | Refills: 0 | Status: SHIPPED | OUTPATIENT
Start: 2019-11-21 | End: 2020-02-26

## 2020-02-26 DIAGNOSIS — I48.19 PERSISTENT ATRIAL FIBRILLATION (HCC): ICD-10-CM

## 2020-02-26 RX ORDER — DIGOXIN 125 UG/1
TABLET ORAL
Qty: 45 TAB | Refills: 0 | Status: SHIPPED | OUTPATIENT
Start: 2020-02-26 | End: 2020-05-26

## 2020-05-08 DIAGNOSIS — I48.91 ATRIAL FIBRILLATION, UNSPECIFIED TYPE (HCC): ICD-10-CM

## 2020-05-08 RX ORDER — RIVAROXABAN 20 MG/1
TABLET, FILM COATED ORAL
Qty: 30 TAB | Refills: 4 | Status: SHIPPED | OUTPATIENT
Start: 2020-05-08 | End: 2020-10-05

## 2020-05-26 DIAGNOSIS — I48.19 PERSISTENT ATRIAL FIBRILLATION (HCC): ICD-10-CM

## 2020-05-26 RX ORDER — DIGOXIN 125 UG/1
TABLET ORAL
Qty: 45 TAB | Refills: 0 | Status: SHIPPED | OUTPATIENT
Start: 2020-05-26 | End: 2020-08-22

## 2020-06-16 ENCOUNTER — OFFICE VISIT (OUTPATIENT)
Dept: CARDIOLOGY CLINIC | Age: 85
End: 2020-06-16

## 2020-06-16 VITALS
SYSTOLIC BLOOD PRESSURE: 152 MMHG | BODY MASS INDEX: 24.29 KG/M2 | HEIGHT: 62 IN | DIASTOLIC BLOOD PRESSURE: 86 MMHG | WEIGHT: 132 LBS | TEMPERATURE: 96.3 F | HEART RATE: 50 BPM

## 2020-06-16 DIAGNOSIS — I10 ESSENTIAL HYPERTENSION, BENIGN: ICD-10-CM

## 2020-06-16 DIAGNOSIS — Z79.01 ANTICOAGULANT LONG-TERM USE: ICD-10-CM

## 2020-06-16 DIAGNOSIS — I48.20 CHRONIC ATRIAL FIBRILLATION (HCC): Primary | ICD-10-CM

## 2020-06-16 DIAGNOSIS — E78.5 HYPERLIPIDEMIA, UNSPECIFIED HYPERLIPIDEMIA TYPE: ICD-10-CM

## 2020-06-16 DIAGNOSIS — I08.0 MITRAL AND AORTIC REGURGITATION: ICD-10-CM

## 2020-06-16 DIAGNOSIS — I50.32 CHRONIC DIASTOLIC CONGESTIVE HEART FAILURE (HCC): ICD-10-CM

## 2020-06-16 NOTE — PROGRESS NOTES
1. Have you been to the ER, urgent care clinic since your last visit? Hospitalized since your last visit?    no    2. Have you seen or consulted any other health care providers outside of the [address] [place] [address] since your last visit? Include any pap smears or colon screening. No     3. Since your last visit, have you had any of the following symptoms?    Shortness of breath

## 2020-06-16 NOTE — PROGRESS NOTES
HISTORY OF PRESENT ILLNESS  Edson Apgar is a 80 y.o. female. Patient with a fib,htn,. On follow up patient denies any chest pains,sob, palpitation or other significant symptoms. Palpitations    The history is provided by the patient. This is a chronic (AFib) problem. The problem occurs rarely. Associated symptoms include lower extremity edema. Pertinent negatives include no fever, no chest pain, no claudication, no orthopnea, no PND, no abdominal pain, no nausea, no vomiting, no headaches, no dizziness, no weakness, no cough, no hemoptysis, no shortness of breath and no sputum production. Leg Swelling   The history is provided by the patient. This is a recurrent problem. Episode onset: -. The problem occurs rarely. The problem has been resolved (since got diuretics from urgent care). Pertinent negatives include no chest pain, no abdominal pain, no headaches and no shortness of breath. The symptoms are aggravated by standing. The symptoms are relieved by sleep. Shortness of Breath   The history is provided by the patient. This is a recurrent problem. The problem occurs intermittently. The current episode started more than 1 week ago. The problem has been resolved. Pertinent negatives include no fever, no headaches, no cough, no sputum production, no hemoptysis, no wheezing, no PND, no orthopnea, no chest pain, no vomiting, no abdominal pain, no rash, no leg swelling and no claudication. The problem's precipitants include exercise (steps). CHF   Pertinent negatives include no chest pain, no abdominal pain, no headaches and no shortness of breath. Review of Systems   Constitutional: Negative for chills and fever. HENT: Negative for nosebleeds. Eyes: Negative for blurred vision and double vision. Respiratory: Negative for cough, hemoptysis, sputum production, shortness of breath and wheezing. Cardiovascular: Positive for palpitations.  Negative for chest pain, orthopnea, claudication, leg swelling and PND. Gastrointestinal: Negative for abdominal pain, heartburn, nausea and vomiting. Musculoskeletal: Negative for myalgias. Skin: Negative for rash. Neurological: Negative for dizziness, weakness and headaches. Endo/Heme/Allergies: Does not bruise/bleed easily. Family History   Problem Relation Age of Onset    Heart Disease Neg Hx         no family history of heart disease       Past Medical History:   Diagnosis Date    Atrial fibrillation ([facility] [address]     Assessment: Maintaining S Sheng on PO amiodarone pft followed by dr Renata Prater. tsh/lft: mildly increased tsh, t4 normal, pf stable, rpt labs pending with pmd    CAD (coronary artery disease)     High cholesterol, a- fib    Chronic airway obstruction, not elsewhere classified 3/21/2013    Chronic lung disease     Essential hypertension, benign     Stable, edema better after receiving hctz    Hypertension     Other and unspecified hyperlipidemia     On Crestor and Fish Oil       Past Surgical History:   Procedure Laterality Date    HX HYSTERECTOMY      HX SPLENECTOMY         Social History     Tobacco Use    Smoking status: Never Smoker    Smokeless tobacco: Never Used   Substance Use Topics    Alcohol use: Yes     Comment: socially       Allergies   Allergen Reactions    Lisinopril Cough     *Antihypertensives *           Visit Vitals  /86   Pulse (!) 50   Temp (!) 96.3 °F (35.7 °C)   Ht 5' 2\" (1.575 m)   Wt 59.9 kg (132 lb)   BMI 24.14 kg/m²       Physical Exam   Constitutional: She is oriented to person, place, and time. She appears well-developed and well-nourished. HENT:   Head: Normocephalic and atraumatic. Eyes: Conjunctivae are normal.   Neck: Neck supple. No JVD present. No tracheal deviation present. No thyromegaly present. Cardiovascular: Normal rate. An irregularly irregular rhythm present. PMI is not displaced. Exam reveals no gallop, no S3 and no decreased pulses. No murmur heard.   Pulmonary/Chest: No respiratory distress. She has no wheezes. She has no rales. She exhibits no tenderness. Abdominal: Soft. There is no abdominal tenderness. Musculoskeletal:         General: No edema. Neurological: She is alert and oriented to person, place, and time. Skin: Skin is warm. Psychiatric: She has a normal mood and affect. Ms. Hien Platt has a reminder for a \"due or due soon\" health maintenance. I have asked that she contact her primary care provider for follow-up on this health maintenance. I Have personally reviewed recent relevant labs available and discussed with patient  3/2016-tsh -high-repeat free t4  lft-normal  No flowsheet data found. I have discussed risk benefit and option of use of amiodarone for arrythmia. Risk of toxicity with medication are informed. Patient will require careful monitoring.  ekg  A fib-4/2017  SUMMARY:4/2017  Left ventricle: Systolic function was normal. Ejection fraction was  estimated to be 55 %. There were no regional wall motion abnormalities. Features were consistent with a pseudonormal left ventricular filling  pattern, with concomitant abnormal relaxation and increased filling  pressure (grade 2 diastolic dysfunction). Left atrium: The atrium was mildly dilated. Right atrium: The atrium was dilated. Mitral valve: There was mild annular calcification. There was mild  regurgitation. Aortic valve: There was mild regurgitation. 4/2017  holter-a fib    4/2017  pft-minimal obstructive and mild diffusion defect-no change 2012            I Have personally reviewed recent relevant labs available and discussed with patient  3/2018  3/2019-digoxin-0.9  Assessment         ICD-10-CM ICD-9-CM    1. Chronic atrial fibrillation (HCC) I48.20 427.31 ECHO ADULT COMPLETE    Stable continue treatment with anticoagulation and rate control   2.  Chronic diastolic congestive heart failure (HCC) I50.32 428.32 ECHO ADULT COMPLETE     428.0     Increase shortness of breath class III recheck echo   3. Mitral and aortic regurgitation I08.0 396.3     Continue treatment monitor   4. Anticoagulant long-term use Z79.01 V58.61     For A. fib   5. Essential hypertension, benign I10 401.1     Continue treatment   6. Hyperlipidemia, unspecified hyperlipidemia type E78.5 272.4     Continue therapy lab with PCP   4/2017-recurrent A. fib-amiodarone stopped-has tried multiple other meds in past  5/2017-discussed options of ablation -wants to continue rate control startegy  5/2018  A fib-rate control and anticoagulation  11/2019  Cardiac status stable. Lipitor increased to 20 mg as LDL was mildly elevated. Digoxin level was 0.6.  9/2019 continue current therapy  There are no discontinued medications. Orders Placed This Encounter    ECHO ADULT COMPLETE     Standing Status:   Future     Standing Expiration Date:   6/16/2021     Order Specific Question:   Contrast Enhancement (Bubble Study, Definity, Optison) may be used if criteria listed in established evidence-based protocol has been identified. Answer:   Yes       Follow-up and Dispositions    · Return in about 3 months (around 9/16/2020).

## 2020-08-22 DIAGNOSIS — I48.19 PERSISTENT ATRIAL FIBRILLATION (HCC): ICD-10-CM

## 2020-08-22 RX ORDER — DIGOXIN 125 MCG
TABLET ORAL
Qty: 45 TAB | Refills: 0 | Status: SHIPPED | OUTPATIENT
Start: 2020-08-22 | End: 2020-11-21

## 2020-09-15 ENCOUNTER — OFFICE VISIT (OUTPATIENT)
Dept: CARDIOLOGY CLINIC | Age: 85
End: 2020-09-15

## 2020-09-15 VITALS
SYSTOLIC BLOOD PRESSURE: 146 MMHG | TEMPERATURE: 97.5 F | DIASTOLIC BLOOD PRESSURE: 58 MMHG | HEIGHT: 64 IN | BODY MASS INDEX: 22.94 KG/M2 | HEART RATE: 41 BPM | WEIGHT: 134.4 LBS

## 2020-09-15 DIAGNOSIS — I10 ESSENTIAL HYPERTENSION, BENIGN: ICD-10-CM

## 2020-09-15 DIAGNOSIS — Z79.01 ANTICOAGULANT LONG-TERM USE: ICD-10-CM

## 2020-09-15 DIAGNOSIS — I50.32 CHRONIC DIASTOLIC CONGESTIVE HEART FAILURE (HCC): ICD-10-CM

## 2020-09-15 DIAGNOSIS — I08.0 MITRAL AND AORTIC REGURGITATION: ICD-10-CM

## 2020-09-15 DIAGNOSIS — I48.20 CHRONIC ATRIAL FIBRILLATION (HCC): Primary | ICD-10-CM

## 2020-09-15 NOTE — PROGRESS NOTES
1. Have you been to the ER, urgent care clinic since your last visit? Hospitalized since your last visit?     no  2. Have you seen or consulted any other health care providers outside of the [address] [place] [address] since your last visit? Include any pap smears or colon screening.       No

## 2020-09-15 NOTE — PROGRESS NOTES
DATE:  05/07/2020



SUBJECTIVE:  The patient is sitting comfortably in her chair, in no apparent

distress.  She apparently has had 2 semisolid bowel movement this morning and

then another one that was watery similar to her when she presented initially. 

She denied any abdominal pain.  Denied any nausea or vomiting.  Denied any

chills, rigors or fever.  She has been generally doing very well; however, she

has been refusing to take her cholestyramine powder.  She also continued to

complain of marked swelling of both lower extremities, although she generally is

feeling much stronger, has been up and about walking with a walker and

participating with physical therapy.  In fact, she is scheduled to be discharged

home tomorrow.



PHYSICAL EXAMINATION:

GENERAL:  When I saw her this afternoon, she looked well and was clearly in no

apparent respiratory distress, pale, but no jaundice, cyanosis or thyromegaly. 

No jugular venous distention.  No limb edema.

VITAL SIGNS:  Her heart rate was 70, blood pressure was 98/66, temperature was

97.7, respiratory rate 20, and oxygen saturation was 100% on room air.

HEAD, EYES, EARS, NOSE AND THROAT:  Showed normocephalic, atraumatic.

NECK:  Supple.

HEART:  Showed normal first and second heart sounds.  No gallop or murmur.

CHEST:  Clear to auscultation.  No crepitation or rhonchi.

ABDOMEN:  Distended, soft, nontender.

NEUROLOGIC:  She is awake, alert, responding appropriately.  All cranial nerves

intact.  She moves extremities without difficulty.  She ambulates with a walker.



Her intake was 1120, no output was recorded.



LABORATORY DATA:  Her blood sugar seems to be well controlled.  Her most recent

lab work, however, showed a white cell count 7500, hemoglobin 8.3, hematocrit

25.8, MCV 96, and platelet count of 192,000.  Her most recent chemistry showed a

serum sodium 137, potassium 4, chloride 109, bicarbonate 17, anion gap of 11,

BUN 36, creatinine 2.3, estimated GFR was 20 mL per minute.  Her glucose was

147.  Her calcium was 8.7.  Total bilirubin, AST, ALT, alkaline phosphatase were

normal.  Total protein was 5.1, albumin was 1.7.



ASSESSMENT:

1.  Clostridium difficile colitis.

2.  Acute on chronic kidney injury.  Her creatinine came down from 3.8-2.3.

3.  Hyponatremia, improved.

4.  The patient has multiple other medical problems including:

A. Insulin-requiring type 2 diabetes mellitus, seems to be well controlled.

B.  Hypertension, well controlled.

C.  Diabetic neuropathy with diabetic autonomic neuropathy.

D.  Hyperlipidemia.

E. Normochromic normocytic anemia.

F.  Hyperchloremic acidosis for which I added sodium bicarbonate and has

apparently improved.  In fact, her acidosis has improved.



PLAN:  My plan is to restart her back on her Questran.  I would repeat her lab

work tomorrow.  She has also severe hypoalbuminemia with the cause of her third

spacing, although urinalysis showed no evidence of proteinuria.





______________________________

ALICIA MOLINA MD



DR:  CONSUELO/kathy  JOB#:  [id number] / [id number]

DD:  05/07/2020 15:13  DT:  05/07/2020 18:55 HISTORY OF PRESENT ILLNESS  Neri Martin is a 80 y.o. female. Patient with a fib,htn,. On follow up patient denies any chest pains,sob, palpitation or other significant symptoms. CHF   Pertinent negatives include no chest pain, no abdominal pain, no headaches and no shortness of breath. Palpitations    The history is provided by the patient. This is a chronic (AFib) problem. The problem occurs rarely. Associated symptoms include lower extremity edema. Pertinent negatives include no fever, no chest pain, no claudication, no orthopnea, no PND, no abdominal pain, no nausea, no vomiting, no headaches, no dizziness, no weakness, no cough, no hemoptysis, no shortness of breath and no sputum production. Leg Swelling   The history is provided by the patient. This is a recurrent problem. Episode onset: 4-5/17. The problem occurs rarely. The problem has been resolved (since got diuretics from urgent care). Pertinent negatives include no chest pain, no abdominal pain, no headaches and no shortness of breath. The symptoms are aggravated by standing. The symptoms are relieved by sleep. Shortness of Breath   The history is provided by the patient. This is a recurrent problem. The problem occurs intermittently. The current episode started more than 1 week ago. The problem has been resolved. Pertinent negatives include no fever, no headaches, no cough, no sputum production, no hemoptysis, no wheezing, no PND, no orthopnea, no chest pain, no vomiting, no abdominal pain, no rash, no leg swelling and no claudication. The problem's precipitants include exercise (steps). Review of Systems   Constitutional: Negative for chills and fever. HENT: Negative for nosebleeds. Eyes: Negative for blurred vision and double vision. Respiratory: Negative for cough, hemoptysis, sputum production, shortness of breath and wheezing. Cardiovascular: Positive for palpitations.  Negative for chest pain, orthopnea, claudication, leg swelling and PND. Gastrointestinal: Negative for abdominal pain, heartburn, nausea and vomiting. Musculoskeletal: Negative for myalgias. Skin: Negative for rash. Neurological: Negative for dizziness, weakness and headaches. Endo/Heme/Allergies: Does not bruise/bleed easily. Family History   Problem Relation Age of Onset    Heart Disease Neg Hx         no family history of heart disease       Past Medical History:   Diagnosis Date    Atrial fibrillation ([facility] [address]     Assessment: Maintaining S Sheng on PO amiodarone pft followed by dr Ector Phelan. tsh/lft: mildly increased tsh, t4 normal, pf stable, rpt labs pending with pmd    CAD (coronary artery disease)     High cholesterol, a- fib    Chronic airway obstruction, not elsewhere classified 3/21/2013    Chronic lung disease     Essential hypertension, benign     Stable, edema better after receiving hctz    Hypertension     Other and unspecified hyperlipidemia     On Crestor and Fish Oil       Past Surgical History:   Procedure Laterality Date    HX HYSTERECTOMY      HX SPLENECTOMY         Social History     Tobacco Use    Smoking status: Never Smoker    Smokeless tobacco: Never Used   Substance Use Topics    Alcohol use: Yes     Comment: socially       Allergies   Allergen Reactions    Lisinopril Cough     *Antihypertensives *           Visit Vitals  BP (!) 146/58   Pulse (!) 41   Temp 97.5 °F (36.4 °C) (Temporal)   Ht 5' 4\" (1.626 m)   Wt 61 kg (134 lb 6.4 oz)   BMI 23.07 kg/m²       Physical Exam   Constitutional: She is oriented to person, place, and time. She appears well-developed and well-nourished. HENT:   Head: Normocephalic and atraumatic. Eyes: Conjunctivae are normal.   Neck: Neck supple. No JVD present. No tracheal deviation present. No thyromegaly present. Cardiovascular: Normal rate. An irregularly irregular rhythm present. PMI is not displaced. Exam reveals no gallop, no S3 and no decreased pulses.    No murmur heard.  Pulmonary/Chest: No respiratory distress. She has no wheezes. She has no rales. She exhibits no tenderness. Abdominal: Soft. There is no abdominal tenderness. Musculoskeletal:         General: No edema. Neurological: She is alert and oriented to person, place, and time. Skin: Skin is warm. Psychiatric: She has a normal mood and affect. Ms. Saqib Rocha has a reminder for a \"due or due soon\" health maintenance. I have asked that she contact her primary care provider for follow-up on this health maintenance. I Have personally reviewed recent relevant labs available and discussed with patient  3/2016-tsh -high-repeat free t4  lft-normal  No flowsheet data found. I have discussed risk benefit and option of use of amiodarone for arrythmia. Risk of toxicity with medication are informed. Patient will require careful monitoring.  ekg  A fib-4/2017  SUMMARY:4/2017  Left ventricle: Systolic function was normal. Ejection fraction was  estimated to be 55 %. There were no regional wall motion abnormalities. Features were consistent with a pseudonormal left ventricular filling  pattern, with concomitant abnormal relaxation and increased filling  pressure (grade 2 diastolic dysfunction). Left atrium: The atrium was mildly dilated. Right atrium: The atrium was dilated. Mitral valve: There was mild annular calcification. There was mild  regurgitation. Aortic valve: There was mild regurgitation. 4/2017  holter-a fib    4/2017  pft-minimal obstructive and mild diffusion defect-no change 2012            I Have personally reviewed recent relevant labs available and discussed with patient  3/2018  3/2019-digoxin-0.9  720digoxin1.2    Interpretation Summary 8/2020    · LV: Normal cavity size, wall thickness and systolic function (ejection fraction normal). Estimated left ventricular ejection fraction is 60 - 65%. Wall motion: normal. Inconclusive left ventricular diastolic function.   · LA: Moderately dilated left atrium. Left Atrium volume index is 46.67 mL/m2. · RA: Mildly dilated right atrium. · AV: Mild aortic valve regurgitation is present. · MV: Mitral valve thickening. Mild mitral valve regurgitation is present. · TV: Mild tricuspid valve regurgitation is present. · PA: Mild pulmonary hypertension. Pulmonary arterial systolic pressure is 42 mmHg. Assessment         ICD-10-CM ICD-9-CM    1. Chronic atrial fibrillation (HCC)  I48.20 427.31     Stable continue treatment   2. Chronic diastolic congestive heart failure (HCC)  I50.32 428.32      428.0     Shortness of breath on exertion normal ejection fraction monitor   3. Mitral and aortic regurgitation  I08.0 396.3     Stable monitor   4. Anticoagulant long-term use  Z79.01 V58.61     For A. fib   5. Essential hypertension, benign  I10 401.1     Stable monitor   4/2017-recurrent A. fib-amiodarone stopped-has tried multiple other meds in past  5/2017-discussed options of ablation -wants to continue rate control startegy  5/2018  A fib-rate control and anticoagulation  11/2019  Cardiac status stable. Lipitor increased to 20 mg as LDL was mildly elevated. Digoxin level was 0.6.  9/2019 continue current therapy  9/2020  Cardiac status stable. Shortness of breath multifactorial continue treatment    There are no discontinued medications. No orders of the defined types were placed in this encounter.

## 2020-09-15 NOTE — PATIENT INSTRUCTIONS
[facility] Activation Thank you for requesting access to [facility]. Please follow the instructions below to securely access and download your online medical record. [facility] allows you to send messages to your doctor, view your test results, renew your prescriptions, schedule appointments, and more. How Do I Sign Up? 1. In your internet browser, go to [email or web address]. 2. Click on the First Time User? Click Here link in the Sign In box. You will see the New Member Sign Up page. 3. Enter your [facility] Access Code exactly as it appears below. You will not need to use this code after youve completed the sign-up process. If you do not sign up before the expiration date, you must request a new code. [facility] Access Code: [email or web address] Expires: 10/30/2020  9:50 AM (This is the date your [facility] access code will ) 4. Enter the last four digits of your Social Security Number (xxxx) and Date of Birth (mm/dd/yyyy) as indicated and click Submit. You will be taken to the next sign-up page. 5. Create a [facility] ID. This will be your [facility] login ID and cannot be changed, so think of one that is secure and easy to remember. 6. Create a [facility] password. You can change your password at any time. 7. Enter your Password Reset Question and Answer. This can be used at a later time if you forget your password. 8. Enter your e-mail address. You will receive e-mail notification when new information is available in [address] 9. Click Sign Up. You can now view and download portions of your medical record. 10. Click the Download Summary menu link to download a portable copy of your medical information. Additional Information If you have questions, please visit the Frequently Asked Questions section of the [facility] website at [email or web address] Remember, [facility] is NOT to be used for urgent needs. For medical emergencies, dial 911.

## 2020-10-05 DIAGNOSIS — I48.91 ATRIAL FIBRILLATION, UNSPECIFIED TYPE (HCC): ICD-10-CM

## 2020-10-05 RX ORDER — RIVAROXABAN 20 MG/1
TABLET, FILM COATED ORAL
Qty: 30 TAB | Refills: 3 | Status: SHIPPED | OUTPATIENT
Start: 2020-10-05 | End: 2021-02-03

## 2020-10-29 RX ORDER — ATORVASTATIN CALCIUM 20 MG/1
TABLET, FILM COATED ORAL
Qty: 90 TAB | Refills: 2 | Status: SHIPPED | OUTPATIENT
Start: 2020-10-29

## 2020-11-21 DIAGNOSIS — I48.19 PERSISTENT ATRIAL FIBRILLATION (HCC): ICD-10-CM

## 2020-11-21 RX ORDER — DIGOXIN 125 MCG
TABLET ORAL
Qty: 45 TAB | Refills: 0 | Status: SHIPPED | OUTPATIENT
Start: 2020-11-21 | End: 2021-02-19

## 2021-02-03 DIAGNOSIS — I48.91 ATRIAL FIBRILLATION, UNSPECIFIED TYPE (HCC): ICD-10-CM

## 2021-02-03 RX ORDER — RIVAROXABAN 20 MG/1
TABLET, FILM COATED ORAL
Qty: 30 TAB | Refills: 2 | Status: SHIPPED | OUTPATIENT
Start: 2021-02-03 | End: 2021-03-09 | Stop reason: SDUPTHER

## 2021-02-19 DIAGNOSIS — I48.19 PERSISTENT ATRIAL FIBRILLATION (HCC): ICD-10-CM

## 2021-02-19 RX ORDER — DIGOXIN 125 MCG
TABLET ORAL
Qty: 45 TAB | Refills: 0 | Status: SHIPPED | OUTPATIENT
Start: 2021-02-19 | End: 2021-05-21

## 2021-03-09 ENCOUNTER — OFFICE VISIT (OUTPATIENT)
Dept: CARDIOLOGY CLINIC | Age: 86
End: 2021-03-09
Payer: MEDICARE

## 2021-03-09 VITALS
DIASTOLIC BLOOD PRESSURE: 81 MMHG | HEART RATE: 51 BPM | TEMPERATURE: 97.3 F | SYSTOLIC BLOOD PRESSURE: 156 MMHG | BODY MASS INDEX: 22.4 KG/M2 | HEIGHT: 64 IN | WEIGHT: 131.2 LBS

## 2021-03-09 DIAGNOSIS — I10 ESSENTIAL HYPERTENSION, BENIGN: ICD-10-CM

## 2021-03-09 DIAGNOSIS — I48.20 CHRONIC ATRIAL FIBRILLATION (HCC): Primary | ICD-10-CM

## 2021-03-09 DIAGNOSIS — I50.32 CHRONIC DIASTOLIC CONGESTIVE HEART FAILURE (HCC): ICD-10-CM

## 2021-03-09 DIAGNOSIS — I08.0 MITRAL AND AORTIC REGURGITATION: ICD-10-CM

## 2021-03-09 DIAGNOSIS — Z79.01 ANTICOAGULANT LONG-TERM USE: ICD-10-CM

## 2021-03-09 DIAGNOSIS — I48.91 ATRIAL FIBRILLATION, UNSPECIFIED TYPE (HCC): ICD-10-CM

## 2021-03-09 DIAGNOSIS — E78.5 HYPERLIPIDEMIA, UNSPECIFIED HYPERLIPIDEMIA TYPE: ICD-10-CM

## 2021-03-09 PROCEDURE — G8420 CALC BMI NORM PARAMETERS: HCPCS | Performed by: INTERNAL MEDICINE

## 2021-03-09 PROCEDURE — G8754 DIAS BP LESS 90: HCPCS | Performed by: INTERNAL MEDICINE

## 2021-03-09 PROCEDURE — G8400 PT W/DXA NO RESULTS DOC: HCPCS | Performed by: INTERNAL MEDICINE

## 2021-03-09 PROCEDURE — 99214 OFFICE O/P EST MOD 30 MIN: CPT | Performed by: INTERNAL MEDICINE

## 2021-03-09 PROCEDURE — 1101F PT FALLS ASSESS-DOCD LE1/YR: CPT | Performed by: INTERNAL MEDICINE

## 2021-03-09 PROCEDURE — G8753 SYS BP > OR = 140: HCPCS | Performed by: INTERNAL MEDICINE

## 2021-03-09 PROCEDURE — G8432 DEP SCR NOT DOC, RNG: HCPCS | Performed by: INTERNAL MEDICINE

## 2021-03-09 PROCEDURE — G8427 DOCREV CUR MEDS BY ELIG CLIN: HCPCS | Performed by: INTERNAL MEDICINE

## 2021-03-09 PROCEDURE — 1090F PRES/ABSN URINE INCON ASSESS: CPT | Performed by: INTERNAL MEDICINE

## 2021-03-09 PROCEDURE — G8536 NO DOC ELDER MAL SCRN: HCPCS | Performed by: INTERNAL MEDICINE

## 2021-03-09 NOTE — PROGRESS NOTES
1. Have you been to the ER, urgent care clinic since your last visit? Hospitalized since your last visit?     no  2. Have you seen or consulted any other health care providers outside of the [address] [place] [address] since your last visit? Include any pap smears or colon screening. No     3. Since your last visit, have you had any of the following symptoms? shortness of breath.

## 2021-03-09 NOTE — PROGRESS NOTES
HISTORY OF PRESENT ILLNESS  Concha Leslie is a 80 y.o. female. Patient with a fib,htn,. On follow up patient denies any chest pains,sob, palpitation or other significant symptoms. Palpitations   The history is provided by the patient. This is a chronic (AFib) problem. The problem occurs rarely. Associated symptoms include lower extremity edema. Pertinent negatives include no fever, no chest pain, no claudication, no orthopnea, no PND, no abdominal pain, no nausea, no vomiting, no headaches, no dizziness, no weakness, no cough, no hemoptysis, no shortness of breath and no sputum production. CHF  Pertinent negatives include no chest pain, no abdominal pain, no headaches and no shortness of breath. Leg Swelling  The history is provided by the patient. This is a recurrent problem. Episode onset: 4-5/17. The problem occurs rarely. The problem has been resolved (since got diuretics from urgent care). Pertinent negatives include no chest pain, no abdominal pain, no headaches and no shortness of breath. The symptoms are aggravated by standing. The symptoms are relieved by sleep. Shortness of Breath  The history is provided by the patient. This is a recurrent problem. The problem occurs intermittently. The current episode started more than 1 week ago. The problem has been resolved. Pertinent negatives include no fever, no headaches, no cough, no sputum production, no hemoptysis, no wheezing, no PND, no orthopnea, no chest pain, no vomiting, no abdominal pain, no rash, no leg swelling and no claudication. The problem's precipitants include exercise (steps). Review of Systems   Constitutional: Negative for chills and fever. HENT: Negative for nosebleeds. Eyes: Negative for blurred vision and double vision. Respiratory: Negative for cough, hemoptysis, sputum production, shortness of breath and wheezing. Cardiovascular: Positive for palpitations.  Negative for chest pain, orthopnea, claudication, leg swelling and PND. Gastrointestinal: Negative for abdominal pain, heartburn, nausea and vomiting. Musculoskeletal: Negative for myalgias. Skin: Negative for rash. Neurological: Negative for dizziness, weakness and headaches. Endo/Heme/Allergies: Does not bruise/bleed easily. Family History   Problem Relation Age of Onset    Heart Disease Neg Hx         no family history of heart disease       Past Medical History:   Diagnosis Date    Atrial fibrillation ([facility] [address]     Assessment: Maintaining S Sheng on PO amiodarone pft followed by dr Rosita Machuca. tsh/lft: mildly increased tsh, t4 normal, pf stable, rpt labs pending with pmd    CAD (coronary artery disease)     High cholesterol, a- fib    Chronic airway obstruction, not elsewhere classified 3/21/2013    Chronic lung disease     Essential hypertension, benign     Stable, edema better after receiving hctz    Hypertension     Other and unspecified hyperlipidemia     On Crestor and Fish Oil       Past Surgical History:   Procedure Laterality Date    HX HYSTERECTOMY      HX SPLENECTOMY         Social History     Tobacco Use    Smoking status: Never Smoker    Smokeless tobacco: Never Used   Substance Use Topics    Alcohol use: Yes     Comment: socially       Allergies   Allergen Reactions    Lisinopril Cough     *Antihypertensives *           Visit Vitals  BP (!) 156/81   Pulse (!) 51   Temp 97.3 °F (36.3 °C) (Temporal)   Ht 5' 4\" (1.626 m)   Wt 59.5 kg (131 lb 3.2 oz)   BMI 22.52 kg/m²       Physical Exam   Constitutional: She is oriented to person, place, and time. She appears well-developed and well-nourished. HENT:   Head: Normocephalic and atraumatic. Eyes: Conjunctivae are normal.   Neck: Neck supple. No JVD present. No tracheal deviation present. No thyromegaly present. Cardiovascular: Normal rate. An irregularly irregular rhythm present. PMI is not displaced. Exam reveals no gallop, no S3 and no decreased pulses.    No murmur heard.  Pulmonary/Chest: No respiratory distress. She has no wheezes. She has no rales. She exhibits no tenderness. Abdominal: Soft. There is no abdominal tenderness. Musculoskeletal:         General: No edema. Neurological: She is alert and oriented to person, place, and time. Skin: Skin is warm. Psychiatric: She has a normal mood and affect. Ms. Marques has a reminder for a \"due or due soon\" health maintenance. I have asked that she contact her primary care provider for follow-up on this health maintenance. I Have personally reviewed recent relevant labs available and discussed with patient  3/2016-tsh -high-repeat free t4  lft-normal  No flowsheet data found. I have discussed risk benefit and option of use of amiodarone for arrythmia. Risk of toxicity with medication are informed. Patient will require careful monitoring.  ekg  A fib-4/2017  SUMMARY:4/2017  Left ventricle: Systolic function was normal. Ejection fraction was  estimated to be 55 %. There were no regional wall motion abnormalities. Features were consistent with a pseudonormal left ventricular filling  pattern, with concomitant abnormal relaxation and increased filling  pressure (grade 2 diastolic dysfunction). Left atrium: The atrium was mildly dilated. Right atrium: The atrium was dilated. Mitral valve: There was mild annular calcification. There was mild  regurgitation. Aortic valve: There was mild regurgitation. 4/2017  holter-a fib    4/2017  pft-minimal obstructive and mild diffusion defect-no change 2012            I Have personally reviewed recent relevant labs available and discussed with patient  3/2018  3/2019-digoxin-0.9  720-digoxin-1.2  1/2021-digoxin and hemoglobin-dig level 0.5  Interpretation Summary 8/2020    · LV: Normal cavity size, wall thickness and systolic function (ejection fraction normal). Estimated left ventricular ejection fraction is 60 - 65%.  Wall motion: normal. Inconclusive left ventricular diastolic function. · LA: Moderately dilated left atrium. Left Atrium volume index is 46.67 mL/m2. · RA: Mildly dilated right atrium. · AV: Mild aortic valve regurgitation is present. · MV: Mitral valve thickening. Mild mitral valve regurgitation is present. · TV: Mild tricuspid valve regurgitation is present. · PA: Mild pulmonary hypertension. Pulmonary arterial systolic pressure is 42 mmHg. Assessment         ICD-10-CM ICD-9-CM    1. Chronic atrial fibrillation (HCC)  I48.20 427.31     Stable continue with rate control monitor   2. Chronic diastolic congestive heart failure (HCC)  I50.32 428.32      428.0     Shortness of breath class III unchanged monitor no fluid overload   3. Mitral and aortic regurgitation  I08.0 396.3     Stable monitor   4. Anticoagulant long-term use  Z79.01 V58.61     For A. fib continue treatment   5. Here  I10 401.1     Mildly elevated. Continue monitoring   6. Hyperlipidemia, unspecified hyperlipidemia type  E78.5 272.4     Continue treatment lab with PCP   7. Atrial fibrillation, unspecified type ([facility] [address]  I48.91 427.31 rivaroxaban (Xarelto) 20 mg tab tablet   4/2017-recurrent A. fib-amiodarone stopped-has tried multiple other meds in past  5/2017-discussed options of ablation -wants to continue rate control startegy  5/2018  A fib-rate control and anticoagulation  11/2019  Cardiac status stable. Lipitor increased to 20 mg as LDL was mildly elevated. Digoxin level was 0.6.  9/2019 continue current therapy  9/2020  Cardiac status stable. Shortness of breath multifactorial continue treatment    Medications Discontinued During This Encounter   Medication Reason    Xarelto 20 mg tab tablet REORDER       Orders Placed This Encounter    rivaroxaban (Xarelto) 20 mg tab tablet     Sig: TAKE ONE TABLET BY MOUTH DAILY WITH DINNER     Dispense:  30 Tab     Refill:  5       Follow-up and Dispositions    · Return in about 6 months (around 9/9/2021).

## 2021-05-21 DIAGNOSIS — I48.19 PERSISTENT ATRIAL FIBRILLATION (HCC): ICD-10-CM

## 2021-05-21 RX ORDER — DIGOXIN 125 MCG
TABLET ORAL
Qty: 45 TABLET | Refills: 0 | Status: SHIPPED | OUTPATIENT
Start: 2021-05-21 | End: 2021-08-19

## 2021-08-19 DIAGNOSIS — I48.19 PERSISTENT ATRIAL FIBRILLATION (HCC): ICD-10-CM

## 2021-08-19 RX ORDER — DIGOXIN 125 MCG
TABLET ORAL
Qty: 45 TABLET | Refills: 0 | Status: SHIPPED | OUTPATIENT
Start: 2021-08-19 | End: 2021-11-22

## 2021-09-14 DIAGNOSIS — I48.91 ATRIAL FIBRILLATION, UNSPECIFIED TYPE (HCC): ICD-10-CM

## 2021-11-20 DIAGNOSIS — I48.19 PERSISTENT ATRIAL FIBRILLATION (HCC): ICD-10-CM

## 2021-11-22 RX ORDER — DIGOXIN 125 MCG
TABLET ORAL
Qty: 45 TABLET | Refills: 0 | Status: SHIPPED | OUTPATIENT
Start: 2021-11-22 | End: 2022-02-21

## 2021-12-14 ENCOUNTER — OFFICE VISIT (OUTPATIENT)
Dept: CARDIOLOGY CLINIC | Age: 86
End: 2021-12-14
Payer: MEDICARE

## 2021-12-14 VITALS
BODY MASS INDEX: 23.98 KG/M2 | WEIGHT: 127 LBS | DIASTOLIC BLOOD PRESSURE: 60 MMHG | HEART RATE: 46 BPM | HEIGHT: 61 IN | SYSTOLIC BLOOD PRESSURE: 146 MMHG

## 2021-12-14 DIAGNOSIS — E78.5 HYPERLIPIDEMIA, UNSPECIFIED HYPERLIPIDEMIA TYPE: ICD-10-CM

## 2021-12-14 DIAGNOSIS — I48.20 CHRONIC ATRIAL FIBRILLATION (HCC): Primary | ICD-10-CM

## 2021-12-14 DIAGNOSIS — I50.32 CHRONIC DIASTOLIC CONGESTIVE HEART FAILURE (HCC): ICD-10-CM

## 2021-12-14 DIAGNOSIS — I08.0 MITRAL AND AORTIC REGURGITATION: ICD-10-CM

## 2021-12-14 DIAGNOSIS — Z79.01 ANTICOAGULANT LONG-TERM USE: ICD-10-CM

## 2021-12-14 PROCEDURE — 99214 OFFICE O/P EST MOD 30 MIN: CPT | Performed by: INTERNAL MEDICINE

## 2021-12-14 PROCEDURE — G8510 SCR DEP NEG, NO PLAN REQD: HCPCS | Performed by: INTERNAL MEDICINE

## 2021-12-14 PROCEDURE — G8427 DOCREV CUR MEDS BY ELIG CLIN: HCPCS | Performed by: INTERNAL MEDICINE

## 2021-12-14 PROCEDURE — G8536 NO DOC ELDER MAL SCRN: HCPCS | Performed by: INTERNAL MEDICINE

## 2021-12-14 PROCEDURE — 1101F PT FALLS ASSESS-DOCD LE1/YR: CPT | Performed by: INTERNAL MEDICINE

## 2021-12-14 PROCEDURE — 1090F PRES/ABSN URINE INCON ASSESS: CPT | Performed by: INTERNAL MEDICINE

## 2021-12-14 PROCEDURE — G8420 CALC BMI NORM PARAMETERS: HCPCS | Performed by: INTERNAL MEDICINE

## 2021-12-14 NOTE — PROGRESS NOTES
1. Have you been to the ER, urgent care clinic since your last visit? Hospitalized since your last visit? Yes Where: [facility]    2. Have you seen or consulted any other health care providers outside of the [address] [place] [address] since your last visit? Include any pap smears or colon screening.       No

## 2021-12-14 NOTE — PROGRESS NOTES
HISTORY OF PRESENT ILLNESS  Marlene Rogers is a 80 y.o. female. Patient with a fib,htn,. On follow up patient denies any chest pains,sob, palpitation or other significant symptoms. 12/2021  Patient is here for follow-up. Since his last evaluation patient had interruption of Xarelto for planned endoscopy. 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 CHF  Pertinent negatives include no chest pain, no abdominal pain, no headaches and no shortness of breath. Palpitations   The history is provided by the patient. This is a chronic (AFib) problem. The problem occurs rarely. Associated symptoms include lower extremity edema. Pertinent negatives include no fever, no chest pain, no claudication, no orthopnea, no PND, no abdominal pain, no nausea, no vomiting, no headaches, no dizziness, no weakness, no cough, no hemoptysis, no shortness of breath and no sputum production. Leg Swelling  The history is provided by the patient. This is a recurrent problem. Episode onset: 4-5/17. The problem occurs rarely. The problem has been resolved (since got diuretics from urgent care). Pertinent negatives include no chest pain, no abdominal pain, no headaches and no shortness of breath. The symptoms are aggravated by standing. The symptoms are relieved by sleep. Shortness of Breath  The history is provided by the patient. This is a recurrent problem. The problem occurs intermittently. The current episode started more than 1 week ago. The problem has been resolved. Pertinent negatives include no fever, no headaches, no cough, no sputum production, no hemoptysis, no wheezing, no PND, no orthopnea, no chest pain, no vomiting, no abdominal pain, no rash, no leg swelling and no claudication. The problem's precipitants include exercise (steps). Follow-up  Pertinent negatives include no chest pain, no abdominal pain, no headaches and no shortness of breath.        Review of Systems   Constitutional: Negative for chills and fever. HENT: Negative for nosebleeds. Eyes: Negative for blurred vision and double vision. Respiratory: Negative for cough, hemoptysis, sputum production, shortness of breath and wheezing. Cardiovascular: Positive for palpitations. Negative for chest pain, orthopnea, claudication, leg swelling and PND. Gastrointestinal: Negative for abdominal pain, heartburn, nausea and vomiting. Musculoskeletal: Negative for myalgias. Skin: Negative for rash. Neurological: Negative for dizziness, weakness and headaches. Endo/Heme/Allergies: Does not bruise/bleed easily. Family History   Problem Relation Age of Onset    Heart Disease Neg Hx         no family history of heart disease       Past Medical History:   Diagnosis Date    Atrial fibrillation ([facility] [address]     Assessment: Maintaining S Sheng on PO amiodarone pft followed by dr Rosita Machuca. tsh/lft: mildly increased tsh, t4 normal, pf stable, rpt labs pending with pmd    CAD (coronary artery disease)     High cholesterol, a- fib    Chronic airway obstruction, not elsewhere classified 3/21/2013    Chronic lung disease     Essential hypertension, benign     Stable, edema better after receiving hctz    Hypertension     Other and unspecified hyperlipidemia     On Crestor and Fish Oil       Past Surgical History:   Procedure Laterality Date    HX HYSTERECTOMY      HX SPLENECTOMY         Social History     Tobacco Use    Smoking status: Never Smoker    Smokeless tobacco: Never Used   Substance Use Topics    Alcohol use: Yes     Comment: socially       Allergies   Allergen Reactions    Lisinopril Cough     *Antihypertensives *           Visit Vitals  BP (!) 146/60   Pulse (!) 46   Ht 5' 1\" (1.549 m)   Wt 57.6 kg (127 lb)   BMI 24.00 kg/m²       Physical Exam  Constitutional:       Appearance: She is well-developed. HENT:      Head: Normocephalic and atraumatic.    Eyes:      Conjunctiva/sclera: Conjunctivae normal.   Neck:      Thyroid: No thyromegaly. Vascular: No JVD. Trachea: No tracheal deviation. Cardiovascular:      Rate and Rhythm: Normal rate. Rhythm irregularly irregular. Chest Wall: PMI is not displaced. Pulses: No decreased pulses. Heart sounds: No murmur heard. No gallop. No S3 sounds. Pulmonary:      Effort: No respiratory distress. Breath sounds: No wheezing or rales. Chest:      Chest wall: No tenderness. Abdominal:      Palpations: Abdomen is soft. Tenderness: There is no abdominal tenderness. Musculoskeletal:      Cervical back: Neck supple. Skin:     General: Skin is warm. Neurological:      Mental Status: She is alert and oriented to person, place, and time. Ms. Randy Rodriguez has a reminder for a \"due or due soon\" health maintenance. I have asked that she contact her primary care provider for follow-up on this health maintenance. I Have personally reviewed recent relevant labs available and discussed with patient  3/2016-tsh -high-repeat free t4  lft-normal  No flowsheet data found. I have discussed risk benefit and option of use of amiodarone for arrythmia. Risk of toxicity with medication are informed. Patient will require careful monitoring.  ekg  A fib-4/2017  SUMMARY:4/2017  Left ventricle: Systolic function was normal. Ejection fraction was  estimated to be 55 %. There were no regional wall motion abnormalities. Features were consistent with a pseudonormal left ventricular filling  pattern, with concomitant abnormal relaxation and increased filling  pressure (grade 2 diastolic dysfunction). Left atrium: The atrium was mildly dilated. Right atrium: The atrium was dilated. Mitral valve: There was mild annular calcification. There was mild  regurgitation. Aortic valve: There was mild regurgitation.   4/2017  holter-a fib    4/2017  pft-minimal obstructive and mild diffusion defect-no change 2012            I Have personally reviewed recent relevant labs available and discussed with patient  3/2018  3/2019-digoxin-0.9  720-digoxin-1.2  1/2021-digoxin and hemoglobin-dig level 0.5  Interpretation Summary 8/2020    · LV: Normal cavity size, wall thickness and systolic function (ejection fraction normal). Estimated left ventricular ejection fraction is 60 - 65%. Wall motion: normal. Inconclusive left ventricular diastolic function. · LA: Moderately dilated left atrium. Left Atrium volume index is 46.67 mL/m2. · RA: Mildly dilated right atrium. · AV: Mild aortic valve regurgitation is present. · MV: Mitral valve thickening. Mild mitral valve regurgitation is present. · TV: Mild tricuspid valve regurgitation is present. · PA: Mild pulmonary hypertension. Pulmonary arterial systolic pressure is 42 mmHg. 7/2021-abdominal CT    1.  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 Critical result called to Dr. Jean Mohr at 8:55 AM on 7/15/2021.     2. Left renal cortical scarring and subcentimeter hypodensity too small to characterize. 3. Mesenteric fluid density, which can reflect mesenteric cyst or entrapment intraperitoneal free fluid. No surrounding inflammatory changes. Cholelithiasis without CT evident cholecystitis.      Signed By: Abe Germain MD on 7/15/2021 9:07 AM    7/2021  CONCLUSIONS     * Left ventricular chamber volume is normal.     * Left ventricular wall thickness is normal.     * Left ventricular systolic function is normal with an ejection fraction of   65 % by visual estimation.     * Left ventricular diastolic function: indeterminate.     * Right ventricular chamber dimension is mildly enlarged.     * Right ventricular systolic function is normal with TAPSE measuring 1.87   cm.     * Left atrial chamber is mildly enlarged with a left atrial volume index of   37.70 ml/m^2 by BP MOD.     * The aortic root at the sinus of Valsalva is normal measuring 3.6 cm with   an index of 2.24 cm/m2.     * Right atrial chamber volume is enlarged.     * Mild aortic, mitral and tricuspid regurgitation.     * Mild pulmonary hypertension, estimated pulmonary arterial systolic   pressure is 41 mmHg.     * Bubble study was performed and was negative for shunt. Assessment         ICD-10-CM ICD-9-CM    1. Chronic atrial fibrillation (HCC)  I48.20 427.31     Stable continue treatment rate controlled   2. Chronic diastolic congestive heart failure (HCC)  I50.32 428.32      428.0     Stable compensated monitor   3. Mitral and aortic regurgitation  I08.0 396.3     Stable monitor   4. Anticoagulant long-term use  Z79.01 V58.61     Continue treatment monitor   5. Hyperlipidemia, unspecified hyperlipidemia type  E78.5 272.4     Continue current therapy lab with PCP   4/2017-recurrent A. fib-amiodarone stopped-has tried multiple other meds in past  5/2017-discussed options of ablation -wants to continue rate control startegy  5/2018  A fib-rate control and anticoagulation  11/2019  Cardiac status stable. Lipitor increased to 20 mg as LDL was mildly elevated. Digoxin level was 0.6.  9/2019 continue current therapy  9/2020  Cardiac status stable. 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 Back on Xarelto. Patient will need Lovenox bridging in future if Xarelto is going to be interrupted cardiac status stable continue current medical management monitor.   Blood pressure mildly elevated will defer adjustment per renal  Medications Discontinued During This Encounter   Medication Reason    valsartan (DIOVAN) 80 mg tablet Not A Current Medication    benzonatate (TESSALON) 100 mg capsule Not A Current Medication    naproxen sodium (ALEVE) 220 mg tablet Not A Current Medication    loratadine (ALLERGY RELIEF, LORATADINE,) 10 mg dissolvable tablet Not A Current Medication       No orders of the defined types were placed in this encounter. Follow-up and Dispositions    · Return in about 6 months (around 6/14/2022).

## 2022-02-20 DIAGNOSIS — I48.19 PERSISTENT ATRIAL FIBRILLATION (HCC): ICD-10-CM

## 2022-02-21 RX ORDER — DIGOXIN 125 MCG
TABLET ORAL
Qty: 45 TABLET | Refills: 3 | Status: SHIPPED
Start: 2022-02-21 | End: 2022-03-15 | Stop reason: ALTCHOICE

## 2022-03-12 DIAGNOSIS — I48.91 ATRIAL FIBRILLATION, UNSPECIFIED TYPE (HCC): ICD-10-CM

## 2022-03-15 ENCOUNTER — OFFICE VISIT (OUTPATIENT)
Dept: CARDIOLOGY CLINIC | Age: 87
End: 2022-03-15
Payer: MEDICARE

## 2022-03-15 VITALS
HEIGHT: 61 IN | SYSTOLIC BLOOD PRESSURE: 185 MMHG | BODY MASS INDEX: 24.73 KG/M2 | WEIGHT: 131 LBS | DIASTOLIC BLOOD PRESSURE: 71 MMHG | HEART RATE: 44 BPM

## 2022-03-15 DIAGNOSIS — E78.5 HYPERLIPIDEMIA, UNSPECIFIED HYPERLIPIDEMIA TYPE: ICD-10-CM

## 2022-03-15 DIAGNOSIS — I08.0 MITRAL AND AORTIC REGURGITATION: ICD-10-CM

## 2022-03-15 DIAGNOSIS — Z79.01 ANTICOAGULANT LONG-TERM USE: ICD-10-CM

## 2022-03-15 DIAGNOSIS — I48.20 CHRONIC ATRIAL FIBRILLATION (HCC): Primary | ICD-10-CM

## 2022-03-15 DIAGNOSIS — I50.32 CHRONIC DIASTOLIC CONGESTIVE HEART FAILURE (HCC): ICD-10-CM

## 2022-03-15 DIAGNOSIS — I10 PRIMARY HYPERTENSION: ICD-10-CM

## 2022-03-15 PROCEDURE — 1090F PRES/ABSN URINE INCON ASSESS: CPT | Performed by: INTERNAL MEDICINE

## 2022-03-15 PROCEDURE — G8432 DEP SCR NOT DOC, RNG: HCPCS | Performed by: INTERNAL MEDICINE

## 2022-03-15 PROCEDURE — 99214 OFFICE O/P EST MOD 30 MIN: CPT | Performed by: INTERNAL MEDICINE

## 2022-03-15 PROCEDURE — G8420 CALC BMI NORM PARAMETERS: HCPCS | Performed by: INTERNAL MEDICINE

## 2022-03-15 PROCEDURE — 1101F PT FALLS ASSESS-DOCD LE1/YR: CPT | Performed by: INTERNAL MEDICINE

## 2022-03-15 PROCEDURE — G8536 NO DOC ELDER MAL SCRN: HCPCS | Performed by: INTERNAL MEDICINE

## 2022-03-15 PROCEDURE — G8427 DOCREV CUR MEDS BY ELIG CLIN: HCPCS | Performed by: INTERNAL MEDICINE

## 2022-03-15 RX ORDER — OLMESARTAN MEDOXOMIL AND HYDROCHLOROTHIAZIDE 40/25 40; 25 MG/1; MG/1
1 TABLET ORAL DAILY
Qty: 90 TABLET | Refills: 3 | Status: SHIPPED | OUTPATIENT
Start: 2022-03-15

## 2022-03-15 NOTE — PROGRESS NOTES
HISTORY OF PRESENT ILLNESS  Elham Machado is a 80 y.o. female. Patient with a fib,htn,. On follow up patient denies any chest pains,sob, palpitation or other significant symptoms. 12/2021  Patient is here for follow-up. Since his last evaluation patient had interruption of Xarelto for planned endoscopy. 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 3/2022  Patient seen today for follow    Follow-up  Pertinent negatives include no chest pain, no abdominal pain, no headaches and no shortness of breath. CHF  Pertinent negatives include no chest pain, no abdominal pain, no headaches and no shortness of breath. Palpitations   The history is provided by the patient. This is a chronic (AFib) problem. The problem occurs rarely. Associated symptoms include lower extremity edema. Pertinent negatives include no fever, no chest pain, no claudication, no orthopnea, no PND, no abdominal pain, no nausea, no vomiting, no headaches, no dizziness, no weakness, no cough, no hemoptysis, no shortness of breath and no sputum production. Leg Swelling  The history is provided by the patient. This is a recurrent problem. Episode onset: 4-5/17. The problem occurs rarely. The problem has been resolved (since got diuretics from urgent care). Pertinent negatives include no chest pain, no abdominal pain, no headaches and no shortness of breath. The symptoms are aggravated by standing. The symptoms are relieved by sleep. Shortness of Breath  The history is provided by the patient. This is a recurrent problem. The problem occurs intermittently. The current episode started more than 1 week ago. The problem has been resolved. Pertinent negatives include no fever, no headaches, no cough, no sputum production, no hemoptysis, no wheezing, no PND, no orthopnea, no chest pain, no vomiting, no abdominal pain, no rash, no leg swelling and no claudication. The problem's precipitants include exercise (steps). Review of Systems   Constitutional: Negative for chills and fever. HENT: Negative for nosebleeds. Eyes: Negative for blurred vision and double vision. Respiratory: Negative for cough, hemoptysis, sputum production, shortness of breath and wheezing. Cardiovascular: Positive for palpitations. Negative for chest pain, orthopnea, claudication, leg swelling and PND. Gastrointestinal: Negative for abdominal pain, heartburn, nausea and vomiting. Musculoskeletal: Negative for myalgias. Skin: Negative for rash. Neurological: Negative for dizziness, weakness and headaches. Endo/Heme/Allergies: Does not bruise/bleed easily.      Family History   Problem Relation Age of Onset    Heart Disease Neg Hx         no family history of heart disease       Past Medical History:   Diagnosis Date    Atrial fibrillation ([facility] [address]     Assessment: Maintaining S Sheng on PO amiodarone pft followed by dr Carroll Houston. tsh/lft: mildly increased tsh, t4 normal, pf stable, rpt labs pending with pmd    CAD (coronary artery disease)     High cholesterol, a- fib    Chronic airway obstruction, not elsewhere classified 3/21/2013    Chronic lung disease     Essential hypertension, benign     Stable, edema better after receiving hctz    Hypertension     Other and unspecified hyperlipidemia     On Crestor and Fish Oil       Past Surgical History:   Procedure Laterality Date    HX HYSTERECTOMY      HX SPLENECTOMY         Social History     Tobacco Use    Smoking status: Never Smoker    Smokeless tobacco: Never Used   Substance Use Topics    Alcohol use: Yes     Comment: socially       Allergies   Allergen Reactions    Lisinopril Cough     *Antihypertensives *           Visit Vitals  BP (!) 185/71 (BP 1 Location: Left upper arm, BP Patient Position: Sitting, BP Cuff Size: Adult)   Pulse (!) 44   Ht 5' 1\" (1.549 m)   Wt 59.4 kg (131 lb)   BMI 24.75 kg/m²       Physical Exam  Constitutional:       Appearance: She is well-developed. HENT:      Head: Normocephalic and atraumatic. Eyes:      Conjunctiva/sclera: Conjunctivae normal.   Neck:      Thyroid: No thyromegaly. Vascular: No JVD. Trachea: No tracheal deviation. Cardiovascular:      Rate and Rhythm: Normal rate. Rhythm irregularly irregular. Chest Wall: PMI is not displaced. Pulses: No decreased pulses. Heart sounds: No murmur heard. No gallop. No S3 sounds. Pulmonary:      Effort: No respiratory distress. Breath sounds: No wheezing or rales. Chest:      Chest wall: No tenderness. Abdominal:      Palpations: Abdomen is soft. Tenderness: There is no abdominal tenderness. Musculoskeletal:      Cervical back: Neck supple. Skin:     General: Skin is warm. Neurological:      Mental Status: She is alert and oriented to person, place, and time. Ms. Rose Mary Cruz has a reminder for a \"due or due soon\" health maintenance. I have asked that she contact her primary care provider for follow-up on this health maintenance. I Have personally reviewed recent relevant labs available and discussed with patient  3/2016-tsh -high-repeat free t4  lft-normal  No flowsheet data found. I have discussed risk benefit and option of use of amiodarone for arrythmia. Risk of toxicity with medication are informed. Patient will require careful monitoring.  ekg  A fib-4/2017  SUMMARY:4/2017  Left ventricle: Systolic function was normal. Ejection fraction was  estimated to be 55 %. There were no regional wall motion abnormalities. Features were consistent with a pseudonormal left ventricular filling  pattern, with concomitant abnormal relaxation and increased filling  pressure (grade 2 diastolic dysfunction). Left atrium: The atrium was mildly dilated. Right atrium: The atrium was dilated. Mitral valve: There was mild annular calcification. There was mild  regurgitation. Aortic valve: There was mild regurgitation.   4/2017  holter-a fib    4/2017  pft-minimal obstructive and mild diffusion defect-no change 2012            I Have personally reviewed recent relevant labs available and discussed with patient  3/2018  3/2019-digoxin-0.9  720-digoxin-1.2  1/2021-digoxin and hemoglobin-dig level 0.5  Interpretation Summary 8/2020    · LV: Normal cavity size, wall thickness and systolic function (ejection fraction normal). Estimated left ventricular ejection fraction is 60 - 65%. Wall motion: normal. Inconclusive left ventricular diastolic function. · LA: Moderately dilated left atrium. Left Atrium volume index is 46.67 mL/m2. · RA: Mildly dilated right atrium. · AV: Mild aortic valve regurgitation is present. · MV: Mitral valve thickening. Mild mitral valve regurgitation is present. · TV: Mild tricuspid valve regurgitation is present. · PA: Mild pulmonary hypertension. Pulmonary arterial systolic pressure is 42 mmHg. 7/2021-abdominal CT    1.  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 Critical result called to Dr. iNco Pendleton at 8:55 AM on 7/15/2021.     2. Left renal cortical scarring and subcentimeter hypodensity too small to characterize. 3. Mesenteric fluid density, which can reflect mesenteric cyst or entrapment intraperitoneal free fluid. No surrounding inflammatory changes. Cholelithiasis without CT evident cholecystitis.      Signed By: Roselyn Napier MD on 7/15/2021 9:07 AM    7/2021  CONCLUSIONS     * Left ventricular chamber volume is normal.     * Left ventricular wall thickness is normal.     * Left ventricular systolic function is normal with an ejection fraction of   65 % by visual estimation.     * Left ventricular diastolic function: indeterminate.     * Right ventricular chamber dimension is mildly enlarged.     * Right ventricular systolic function is normal with TAPSE measuring 1.87   cm.     * Left atrial chamber is mildly enlarged with a left atrial volume index of   37.70 ml/m^2 by BP MOD.     * The aortic root at the sinus of Valsalva is normal measuring 3.6 cm with   an index of 2.24 cm/m2.     * Right atrial chamber volume is enlarged.     * Mild aortic, mitral and tricuspid regurgitation.     * Mild pulmonary hypertension, estimated pulmonary arterial systolic   pressure is 41 mmHg.     * Bubble study was performed and was negative for shunt. Assessment         ICD-10-CM ICD-9-CM    1. Chronic atrial fibrillation (HCC)  I48.20 427.31     Stable  heart rate on slow side continue monitoring   2. Chronic diastolic congestive heart failure (HCC)  I50.32 428.32      428.0     Stable compensated continue treatment class III   3. Mitral and aortic regurgitation  I08.0 396.3     Stable monitor   4. Anticoagulant long-term use  Z79.01 V58.61     Continue for A. fib monitor   5. Hyperlipidemia, unspecified hyperlipidemia type  E78.5 272.4     Continue treatment monitor lab with PCP   6. Primary hypertension  I10 401.9     Severe uncontrolled hypertension I am going to change her losartan to olmesartan.   4/2017-recurrent A. fib-amiodarone stopped-has tried multiple other meds in past  5/2017-discussed options of ablation -wants to continue rate control startegy  5/2018  A fib-rate control and anticoagulation  11/2019  Cardiac status stable. Lipitor increased to 20 mg as LDL was mildly elevated. Digoxin level was 0.6.  9/2019 continue current therapy  9/2020  Cardiac status stable. 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 Back on Xarelto. Patient will need Lovenox bridging in future if Xarelto is going to be interrupted cardiac status stable continue current medical management monitor. Blood pressure mildly elevated will defer adjustment per renal  3/2022  Severe uncontrolled hypertension. Discontinue losartan.   Change to olmesartan HCTZ. At next visit consider increasing diltiazem if heart rate is acceptable. I have discontinued digoxin as she has bradycardia  Medications Discontinued During This Encounter   Medication Reason    losartan (COZAAR) 25 mg tablet Alternate Therapy    digoxin (LANOXIN) 0.125 mg tablet Alternate Therapy       Orders Placed This Encounter    olmesartan-hydroCHLOROthiazide (BENICAR HCT) 40-25 mg per tablet     Sig: Take 1 Tablet by mouth daily. Dispense:  90 Tablet     Refill:  3       Follow-up and Dispositions    · Return in about 4 weeks (around 4/12/2022) for bp chart at home.

## 2022-03-15 NOTE — PROGRESS NOTES
1. Have you been to the ER, urgent care clinic since your last visit? Hospitalized since your last visit? No    2. Have you seen or consulted any other health care providers outside of the [address] [place] [address] since your last visit? Include any pap smears or colon screening. Yes Where: Dr. Jamie Staples for blood pressure, Dr. Crystal Escalona for Kidneys     3. Do you need any refills today?   Yes

## 2022-03-18 PROBLEM — I50.32 CHRONIC DIASTOLIC CONGESTIVE HEART FAILURE (HCC): Status: ACTIVE | Noted: 2018-03-08

## 2022-03-19 PROBLEM — I08.0 MITRAL AND AORTIC REGURGITATION: Status: ACTIVE | Noted: 2017-05-02

## 2022-03-19 PROBLEM — Z79.01 ANTICOAGULANT LONG-TERM USE: Status: ACTIVE | Noted: 2019-05-14

## 2022-04-19 ENCOUNTER — OFFICE VISIT (OUTPATIENT)
Dept: CARDIOLOGY CLINIC | Age: 87
End: 2022-04-19
Payer: MEDICARE

## 2022-04-19 VITALS
SYSTOLIC BLOOD PRESSURE: 119 MMHG | HEIGHT: 61 IN | WEIGHT: 125 LBS | DIASTOLIC BLOOD PRESSURE: 64 MMHG | HEART RATE: 65 BPM | BODY MASS INDEX: 23.6 KG/M2

## 2022-04-19 DIAGNOSIS — I48.20 CHRONIC ATRIAL FIBRILLATION (HCC): Primary | ICD-10-CM

## 2022-04-19 DIAGNOSIS — Z79.01 ANTICOAGULANT LONG-TERM USE: ICD-10-CM

## 2022-04-19 DIAGNOSIS — I10 PRIMARY HYPERTENSION: ICD-10-CM

## 2022-04-19 DIAGNOSIS — I50.32 CHRONIC DIASTOLIC CONGESTIVE HEART FAILURE (HCC): ICD-10-CM

## 2022-04-19 PROCEDURE — G8510 SCR DEP NEG, NO PLAN REQD: HCPCS | Performed by: INTERNAL MEDICINE

## 2022-04-19 PROCEDURE — 99214 OFFICE O/P EST MOD 30 MIN: CPT | Performed by: INTERNAL MEDICINE

## 2022-04-19 PROCEDURE — G8420 CALC BMI NORM PARAMETERS: HCPCS | Performed by: INTERNAL MEDICINE

## 2022-04-19 PROCEDURE — G8427 DOCREV CUR MEDS BY ELIG CLIN: HCPCS | Performed by: INTERNAL MEDICINE

## 2022-04-19 PROCEDURE — G8536 NO DOC ELDER MAL SCRN: HCPCS | Performed by: INTERNAL MEDICINE

## 2022-04-19 PROCEDURE — 1101F PT FALLS ASSESS-DOCD LE1/YR: CPT | Performed by: INTERNAL MEDICINE

## 2022-04-19 PROCEDURE — 1090F PRES/ABSN URINE INCON ASSESS: CPT | Performed by: INTERNAL MEDICINE

## 2022-04-19 RX ORDER — DILTIAZEM HYDROCHLORIDE 180 MG/1
CAPSULE, COATED, EXTENDED RELEASE ORAL
Qty: 90 CAPSULE | Refills: 3
Start: 2022-04-19

## 2022-04-19 NOTE — PROGRESS NOTES
HISTORY OF PRESENT ILLNESS  Amari Mcghee is a 80 y.o. female. Patient with a fib,htn,. On follow up patient denies any chest pains,sob, palpitation or other significant symptoms. 12/2021  Patient is here for follow-up. Since his last evaluation patient had interruption of Xarelto for planned endoscopy. 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 3/2022  Patient seen today for follow    Follow-up  Pertinent negatives include no chest pain, no abdominal pain, no headaches and no shortness of breath. CHF  Pertinent negatives include no chest pain, no abdominal pain, no headaches and no shortness of breath. Palpitations   The history is provided by the patient. This is a chronic (AFib) problem. The problem occurs rarely. Associated symptoms include lower extremity edema. Pertinent negatives include no fever, no chest pain, no claudication, no orthopnea, no PND, no abdominal pain, no nausea, no vomiting, no headaches, no dizziness, no weakness, no cough, no hemoptysis, no shortness of breath and no sputum production. Leg Swelling  The history is provided by the patient. This is a recurrent problem. Episode onset: 4-5/17. The problem occurs rarely. The problem has been resolved (since got diuretics from urgent care). Pertinent negatives include no chest pain, no abdominal pain, no headaches and no shortness of breath. The symptoms are aggravated by standing. The symptoms are relieved by sleep. Shortness of Breath  The history is provided by the patient. This is a recurrent problem. The problem occurs intermittently. The current episode started more than 1 week ago. The problem has been resolved. Pertinent negatives include no fever, no headaches, no cough, no sputum production, no hemoptysis, no wheezing, no PND, no orthopnea, no chest pain, no vomiting, no abdominal pain, no rash, no leg swelling and no claudication. The problem's precipitants include exercise (steps). Review of Systems   Constitutional: Negative for chills and fever. HENT: Negative for nosebleeds. Eyes: Negative for blurred vision and double vision. Respiratory: Negative for cough, hemoptysis, sputum production, shortness of breath and wheezing. Cardiovascular: Positive for palpitations. Negative for chest pain, orthopnea, claudication, leg swelling and PND. Gastrointestinal: Negative for abdominal pain, heartburn, nausea and vomiting. Musculoskeletal: Negative for myalgias. Skin: Negative for rash. Neurological: Negative for dizziness, weakness and headaches. Endo/Heme/Allergies: Does not bruise/bleed easily.      Family History   Problem Relation Age of Onset    Heart Disease Neg Hx         no family history of heart disease       Past Medical History:   Diagnosis Date    Atrial fibrillation ([facility] [address]     Assessment: Maintaining S Sheng on PO amiodarone pft followed by dr Rki Robledo. tsh/lft: mildly increased tsh, t4 normal, pf stable, rpt labs pending with pmd    CAD (coronary artery disease)     High cholesterol, a- fib    Chronic airway obstruction, not elsewhere classified 3/21/2013    Chronic lung disease     Essential hypertension, benign     Stable, edema better after receiving hctz    Hypertension     Other and unspecified hyperlipidemia     On Crestor and Fish Oil       Past Surgical History:   Procedure Laterality Date    HX HYSTERECTOMY      HX SPLENECTOMY         Social History     Tobacco Use    Smoking status: Never Smoker    Smokeless tobacco: Never Used   Substance Use Topics    Alcohol use: Yes     Comment: socially       Allergies   Allergen Reactions    Lisinopril Cough     *Antihypertensives *    Codeine Other (comments)     Severe n/v      Piperacillin-Tazobactam Itching           Visit Vitals  /64 (BP 1 Location: Left upper arm, BP Patient Position: Sitting, BP Cuff Size: Adult)   Pulse 65   Ht 5' 1\" (1.549 m)   Wt 56.7 kg (125 lb)   BMI 23.62 kg/m² Physical Exam  Constitutional:       Appearance: She is well-developed. HENT:      Head: Normocephalic and atraumatic. Eyes:      Conjunctiva/sclera: Conjunctivae normal.   Neck:      Thyroid: No thyromegaly. Vascular: No JVD. Trachea: No tracheal deviation. Cardiovascular:      Rate and Rhythm: Normal rate. Rhythm irregularly irregular. Chest Wall: PMI is not displaced. Pulses: No decreased pulses. Heart sounds: No murmur heard. No gallop. No S3 sounds. Pulmonary:      Effort: No respiratory distress. Breath sounds: No wheezing or rales. Chest:      Chest wall: No tenderness. Abdominal:      Palpations: Abdomen is soft. Tenderness: There is no abdominal tenderness. Musculoskeletal:      Cervical back: Neck supple. Skin:     General: Skin is warm. Neurological:      Mental Status: She is alert and oriented to person, place, and time. Ms. Stephen Perez has a reminder for a \"due or due soon\" health maintenance. I have asked that she contact her primary care provider for follow-up on this health maintenance. I Have personally reviewed recent relevant labs available and discussed with patient  3/2016-tsh -high-repeat free t4  lft-normal  No flowsheet data found. I have discussed risk benefit and option of use of amiodarone for arrythmia. Risk of toxicity with medication are informed. Patient will require careful monitoring.  ekg  A fib-4/2017  SUMMARY:4/2017  Left ventricle: Systolic function was normal. Ejection fraction was  estimated to be 55 %. There were no regional wall motion abnormalities. Features were consistent with a pseudonormal left ventricular filling  pattern, with concomitant abnormal relaxation and increased filling  pressure (grade 2 diastolic dysfunction). Left atrium: The atrium was mildly dilated. Right atrium: The atrium was dilated. Mitral valve: There was mild annular calcification. There was mild  regurgitation.     Aortic valve: There was mild regurgitation. 4/2017  holter-a fib    4/2017  pft-minimal obstructive and mild diffusion defect-no change 2012            I Have personally reviewed recent relevant labs available and discussed with patient  3/2018  3/2019-digoxin-0.9  720-digoxin-1.2  1/2021-digoxin and hemoglobin-dig level 0.5  Interpretation Summary 8/2020    · LV: Normal cavity size, wall thickness and systolic function (ejection fraction normal). Estimated left ventricular ejection fraction is 60 - 65%. Wall motion: normal. Inconclusive left ventricular diastolic function. · LA: Moderately dilated left atrium. Left Atrium volume index is 46.67 mL/m2. · RA: Mildly dilated right atrium. · AV: Mild aortic valve regurgitation is present. · MV: Mitral valve thickening. Mild mitral valve regurgitation is present. · TV: Mild tricuspid valve regurgitation is present. · PA: Mild pulmonary hypertension. Pulmonary arterial systolic pressure is 42 mmHg. 7/2021-abdominal CT    1.  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 Critical result called to Dr. Mariah Buckley at 8:55 AM on 7/15/2021.     2. Left renal cortical scarring and subcentimeter hypodensity too small to characterize. 3. Mesenteric fluid density, which can reflect mesenteric cyst or entrapment intraperitoneal free fluid. No surrounding inflammatory changes. Cholelithiasis without CT evident cholecystitis.      Signed By: Shazia Delacruz MD on 7/15/2021 9:07 AM    7/2021  CONCLUSIONS     * Left ventricular chamber volume is normal.     * Left ventricular wall thickness is normal.     * Left ventricular systolic function is normal with an ejection fraction of   65 % by visual estimation.     * Left ventricular diastolic function: indeterminate.     * Right ventricular chamber dimension is mildly enlarged.     * Right ventricular systolic function is normal with TAPSE measuring 1.87   cm.     * Left atrial chamber is mildly enlarged with a left atrial volume index of   37.70 ml/m^2 by BP MOD.     * The aortic root at the sinus of Valsalva is normal measuring 3.6 cm with   an index of 2.24 cm/m2.     * Right atrial chamber volume is enlarged.     * Mild aortic, mitral and tricuspid regurgitation.     * Mild pulmonary hypertension, estimated pulmonary arterial systolic   pressure is 41 mmHg.     * Bubble study was performed and was negative for shunt. Assessment         ICD-10-CM ICD-9-CM    1. Chronic atrial fibrillation (HCC)  I48.20 427.31     Stable rate controlled continue treatment   2. Chronic diastolic congestive heart failure (HCC)  I50.32 428.32      428.0     Stable compensated class II   3. Anticoagulant long-term use  Z79.01 V58.61     Stable monitor continue for A. fib   4. Primary hypertension  I10 401.9     Much better controlled on treatment continue therapy   4/2017-recurrent A. fib-amiodarone stopped-has tried multiple other meds in past  5/2017-discussed options of ablation -wants to continue rate control startegy  5/2018  A fib-rate control and anticoagulation  11/2019  Cardiac status stable. Lipitor increased to 20 mg as LDL was mildly elevated. Digoxin level was 0.6.  9/2019 continue current therapy  9/2020  Cardiac status stable. 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 Back on Xarelto. Patient will need Lovenox bridging in future if Xarelto is going to be interrupted cardiac status stable continue current medical management monitor. Blood pressure mildly elevated will defer adjustment per renal  3/2022  Severe uncontrolled hypertension. Discontinue losartan. Change to olmesartan HCTZ. At next visit consider increasing diltiazem if heart rate is acceptable.   I have discontinued digoxin as she has bradycardia  4/2022  Cardiac status is significantly better. Blood pressure much better controlled continue current treatment with diltiazem and olmesartan HCTZ.   Medications Discontinued During This Encounter   Medication Reason    dilTIAZem CD (CARTIA XT) 180 mg ER capsule Not A Current Medication       Orders Placed This Encounter    dilTIAZem ER (Cartia XT) 180 mg capsule     Sig: TAKE ONE CAPSULE BY MOUTH DAILY     Dispense:  90 Capsule     Refill:  3

## 2022-04-19 NOTE — PROGRESS NOTES
1. Have you been to the ER, urgent care clinic since your last visit? Hospitalized since your last visit? No    2. Have you seen or consulted any other health care providers outside of the [address] [place] [address] since your last visit? Include any pap smears or colon screening. Yes Where: PCP/ Routine Checkup     3. Do you need any refills today?   no

## 2022-08-11 ENCOUNTER — OFFICE VISIT (OUTPATIENT)
Dept: CARDIOLOGY CLINIC | Age: 87
End: 2022-08-11
Payer: MEDICARE

## 2022-08-11 VITALS
HEART RATE: 63 BPM | DIASTOLIC BLOOD PRESSURE: 80 MMHG | OXYGEN SATURATION: 97 % | WEIGHT: 128 LBS | SYSTOLIC BLOOD PRESSURE: 143 MMHG | BODY MASS INDEX: 24.17 KG/M2 | HEIGHT: 61 IN

## 2022-08-11 DIAGNOSIS — I50.32 CHRONIC DIASTOLIC CONGESTIVE HEART FAILURE (HCC): ICD-10-CM

## 2022-08-11 DIAGNOSIS — Z79.01 ANTICOAGULANT LONG-TERM USE: ICD-10-CM

## 2022-08-11 DIAGNOSIS — Z09 HOSPITAL DISCHARGE FOLLOW-UP: ICD-10-CM

## 2022-08-11 DIAGNOSIS — I48.20 CHRONIC ATRIAL FIBRILLATION (HCC): Primary | ICD-10-CM

## 2022-08-11 DIAGNOSIS — I10 PRIMARY HYPERTENSION: ICD-10-CM

## 2022-08-11 DIAGNOSIS — J44.9 CHRONIC OBSTRUCTIVE PULMONARY DISEASE, UNSPECIFIED COPD TYPE (HCC): ICD-10-CM

## 2022-08-11 PROCEDURE — G8427 DOCREV CUR MEDS BY ELIG CLIN: HCPCS | Performed by: INTERNAL MEDICINE

## 2022-08-11 PROCEDURE — 1111F DSCHRG MED/CURRENT MED MERGE: CPT | Performed by: INTERNAL MEDICINE

## 2022-08-11 PROCEDURE — G8432 DEP SCR NOT DOC, RNG: HCPCS | Performed by: INTERNAL MEDICINE

## 2022-08-11 PROCEDURE — 99214 OFFICE O/P EST MOD 30 MIN: CPT | Performed by: INTERNAL MEDICINE

## 2022-08-11 PROCEDURE — 1123F ACP DISCUSS/DSCN MKR DOCD: CPT | Performed by: INTERNAL MEDICINE

## 2022-08-11 PROCEDURE — 1101F PT FALLS ASSESS-DOCD LE1/YR: CPT | Performed by: INTERNAL MEDICINE

## 2022-08-11 PROCEDURE — G8536 NO DOC ELDER MAL SCRN: HCPCS | Performed by: INTERNAL MEDICINE

## 2022-08-11 PROCEDURE — 1090F PRES/ABSN URINE INCON ASSESS: CPT | Performed by: INTERNAL MEDICINE

## 2022-08-11 PROCEDURE — G8420 CALC BMI NORM PARAMETERS: HCPCS | Performed by: INTERNAL MEDICINE

## 2022-08-11 NOTE — PROGRESS NOTES
1. Have you been to the ER, urgent care clinic since your last visit? Hospitalized since your last visit? Yes Where: [facility]    2. Have you seen or consulted any other health care providers outside of the [address] [place] [address] since your last visit? Include any pap smears or colon screening.  No

## 2022-08-11 NOTE — PROGRESS NOTES
HISTORY OF PRESENT ILLNESS  Kellee Moore is a 80 y.o. female. Patient with a fib,htn,. On follow up patient denies any chest pains,sob, palpitation or other significant symptoms. 12/2021  Patient is here for follow-up. Since his last evaluation patient had interruption of Xarelto for planned endoscopy. 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 3/2022  Patient seen today for follow    Follow-up  Pertinent negatives include no chest pain, no abdominal pain, no headaches and no shortness of breath. CHF  Pertinent negatives include no chest pain, no abdominal pain, no headaches and no shortness of breath. Palpitations   The history is provided by the Patient. This is a chronic (AFib) problem. The problem occurs rarely. Associated symptoms include lower extremity edema. Pertinent negatives include no fever, no chest pain, no claudication, no orthopnea, no PND, no abdominal pain, no nausea, no vomiting, no headaches, no dizziness, no weakness, no cough, no hemoptysis, no shortness of breath and no sputum production. Leg Swelling  The history is provided by the Patient. This is a recurrent problem. Episode onset: 4-5/17. The problem occurs rarely. The problem has been resolved (since got diuretics from urgent care). Pertinent negatives include no chest pain, no abdominal pain, no headaches and no shortness of breath. The symptoms are aggravated by standing. The symptoms are relieved by sleep. Shortness of Breath  The history is provided by the Patient. This is a recurrent problem. The problem occurs intermittently. The current episode started more than 1 week ago. The problem has been resolved. Pertinent negatives include no fever, no headaches, no cough, no sputum production, no hemoptysis, no wheezing, no PND, no orthopnea, no chest pain, no vomiting, no abdominal pain, no rash, no leg swelling and no claudication. The problem's precipitants include exercise (steps).    Hospital Follow Up  Pertinent negatives include no chest pain, no abdominal pain, no headaches and no shortness of breath. Review of Systems   Constitutional:  Negative for chills and fever. HENT:  Negative for nosebleeds. Eyes:  Negative for blurred vision and double vision. Respiratory:  Negative for cough, hemoptysis, sputum production, shortness of breath and wheezing. Cardiovascular:  Positive for palpitations. Negative for chest pain, orthopnea, claudication, leg swelling and PND. Gastrointestinal:  Negative for abdominal pain, heartburn, nausea and vomiting. Musculoskeletal:  Negative for myalgias. Skin:  Negative for rash. Neurological:  Negative for dizziness, weakness and headaches. Endo/Heme/Allergies:  Does not bruise/bleed easily.    Family History   Problem Relation Age of Onset    Heart Disease Neg Hx         no family history of heart disease       Past Medical History:   Diagnosis Date    Atrial fibrillation ([facility] [address]     Assessment: Maintaining S Sheng on PO amiodarone pft followed by dr Bobo Urrutia. tsh/lft: mildly increased tsh, t4 normal, pf stable, rpt labs pending with pmd    CAD (coronary artery disease)     High cholesterol, a- fib    Chronic airway obstruction, not elsewhere classified 3/21/2013    Chronic lung disease     Essential hypertension, benign     Stable, edema better after receiving hctz    Hypertension     Other and unspecified hyperlipidemia     On Crestor and Fish Oil       Past Surgical History:   Procedure Laterality Date    HX HYSTERECTOMY      HX SPLENECTOMY         Social History     Tobacco Use    Smoking status: Never    Smokeless tobacco: Never   Substance Use Topics    Alcohol use: Yes     Comment: socially       Allergies   Allergen Reactions    Lisinopril Cough     *Antihypertensives *    Codeine Other (comments)     Severe n/v      Piperacillin-Tazobactam Itching           Visit Vitals  BP (!) 143/80 (BP 1 Location: Left upper arm, BP Patient Position: Sitting, BP Cuff Size: Small adult)   Pulse 63   Ht 5' 1\" (1.549 m)   Wt 58.1 kg (128 lb)   SpO2 97%   BMI 24.19 kg/m²       Physical Exam  Constitutional:       Appearance: She is well-developed. HENT:      Head: Normocephalic and atraumatic. Eyes:      Conjunctiva/sclera: Conjunctivae normal.   Neck:      Thyroid: No thyromegaly. Vascular: No JVD. Trachea: No tracheal deviation. Cardiovascular:      Rate and Rhythm: Normal rate. Rhythm irregularly irregular. Chest Wall: PMI is not displaced. Pulses: No decreased pulses. Heart sounds: No murmur heard. No gallop. No S3 sounds. Pulmonary:      Effort: No respiratory distress. Breath sounds: No wheezing or rales. Chest:      Chest wall: No tenderness. Abdominal:      Palpations: Abdomen is soft. Tenderness: There is no abdominal tenderness. Musculoskeletal:      Cervical back: Neck supple. Skin:     General: Skin is warm. Neurological:      Mental Status: She is alert and oriented to person, place, and time. Ms. Virginia Valle has a reminder for a \"due or due soon\" health maintenance. I have asked that she contact her primary care provider for follow-up on this health maintenance. I Have personally reviewed recent relevant labs available and discussed with patient  3/2016-tsh -high-repeat free t4  lft-normal  No flowsheet data found. I have discussed risk benefit and option of use of amiodarone for arrythmia. Risk of toxicity with medication are informed. Patient will require careful monitoring.  ekg  A fib-4/2017  SUMMARY:4/2017  Left ventricle: Systolic function was normal. Ejection fraction was  estimated to be 55 %. There were no regional wall motion abnormalities. Features were consistent with a pseudonormal left ventricular filling  pattern, with concomitant abnormal relaxation and increased filling  pressure (grade 2 diastolic dysfunction). Left atrium: The atrium was mildly dilated. Right atrium:  The atrium was dilated. Mitral valve: There was mild annular calcification. There was mild  regurgitation. Aortic valve: There was mild regurgitation. 4/2017  holter-a fib    4/2017  pft-minimal obstructive and mild diffusion defect-no change 2012            I Have personally reviewed recent relevant labs available and discussed with patient  3/2018  3/2019-digoxin-0.9  720-digoxin-1.2  1/2021-digoxin and hemoglobin-dig level 0.5  Interpretation Summary 8/2020    LV: Normal cavity size, wall thickness and systolic function (ejection fraction normal). Estimated left ventricular ejection fraction is 60 - 65%. Wall motion: normal. Inconclusive left ventricular diastolic function. LA: Moderately dilated left atrium. Left Atrium volume index is 46.67 mL/m2. RA: Mildly dilated right atrium. AV: Mild aortic valve regurgitation is present. MV: Mitral valve thickening. Mild mitral valve regurgitation is present. TV: Mild tricuspid valve regurgitation is present. PA: Mild pulmonary hypertension. Pulmonary arterial systolic pressure is 42 mmHg. 7/2021-abdominal CT    1. 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 Critical result called to Dr. Sonia Galindo at 8:55 AM on 7/15/2021.     2. Left renal cortical scarring and subcentimeter hypodensity too small to characterize. 3. Mesenteric fluid density, which can reflect mesenteric cyst or entrapment intraperitoneal free fluid. No surrounding inflammatory changes. Cholelithiasis without CT evident cholecystitis. Signed By: Afsaneh Jc MD on 7/15/2021 9:07 AM    7/2021  CONCLUSIONS     * Left ventricular chamber volume is normal.     * Left ventricular wall thickness is normal.     * Left ventricular systolic function is normal with an ejection fraction of   65 % by visual estimation. * Left ventricular diastolic function: indeterminate.      * Right ventricular chamber dimension is mildly enlarged. * Right ventricular systolic function is normal with TAPSE measuring 1.87   cm. * Left atrial chamber is mildly enlarged with a left atrial volume index of   37.70 ml/m^2 by BP MOD. * The aortic root at the sinus of Valsalva is normal measuring 3.6 cm with   an index of 2.24 cm/m2. * Right atrial chamber volume is enlarged. * Mild aortic, mitral and tricuspid regurgitation. * Mild pulmonary hypertension, estimated pulmonary arterial systolic   pressure is 41 mmHg. * Bubble study was performed and was negative for shunt. Echo Cardiogram Complete    Anatomical Region Laterality Modality   -- -- Color Flow Doppler       Narrative 7/2022    CONCLUSIONS     * Left ventricular chamber size, wall thickness, and systolic function are   normal with no regional wall motion abnormalities. * Left ventricular systolic function is normal with an ejection fraction of   60 % by visual estimation. * Unable to accurately assess left ventricular diastolic function due to   arrhythmia. * Right ventricular chamber dimension is mildly enlarged with normal   systolic function. .     * Right atrial chamber size is mildly enlarged. * Left atrial chamber is borderline enlarged with a left atrial volume index   of 34.66 ml/m^2 by BP MOD. * Mild MR & AI. Moderate TR.     * RVSP ~ 64 mmHg. * No mass, shunts, or thrombi. Assessment         ICD-10-CM ICD-9-CM    1. Chronic atrial fibrillation (HCC)  I48.20 427.31       2. Anticoagulant long-term use  Z79.01 V58.61       3. Chronic diastolic congestive heart failure (HCC)  I50.32 428.32      428.0       4. Primary hypertension  I10 401.9       5. Mitral and aortic regurgitation  I08.0 396.3       6.  Hyperlipidemia, unspecified hyperlipidemia type  E78.5 272.4       4/2017-recurrent A. fib-amiodarone stopped-has tried multiple other meds in past  5/2017-discussed options of ablation -wants to continue rate control startegy  5/2018  A fib-rate control and anticoagulation  11/2019  Cardiac status stable. Lipitor increased to 20 mg as LDL was mildly elevated. Digoxin level was 0.6.  9/2019 continue current therapy  9/2020  Cardiac status stable. 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 Back on Xarelto. Patient will need Lovenox bridging in future if Xarelto is going to be interrupted cardiac status stable continue current medical management monitor. Blood pressure mildly elevated will defer adjustment per renal  3/2022  Severe uncontrolled hypertension. Discontinue losartan. Change to olmesartan HCTZ. At next visit consider increasing diltiazem if heart rate is acceptable. I have discontinued digoxin as she has bradycardia  4/2022  Cardiac status is significantly better. Blood pressure much better controlled continue current treatment with diltiazem and olmesartan HCTZ. 8/2022  Seen after recent admission in July 2022 with acute exacerbation of COPD and respiratory failure. At that time she was in A. fib in the hospital.  At follow-up she feels significantly better rate is better controlled. Blood pressure stable. Continue current treatment  There are no discontinued medications. No orders of the defined types were placed in this encounter.

## 2022-09-09 DIAGNOSIS — I48.91 ATRIAL FIBRILLATION, UNSPECIFIED TYPE (HCC): ICD-10-CM

## 2022-09-30 ENCOUNTER — TRANSCRIBE ORDER (OUTPATIENT)
Dept: SCHEDULING | Age: 87
End: 2022-09-30

## 2022-09-30 DIAGNOSIS — Z12.31 VISIT FOR SCREENING MAMMOGRAM: Primary | ICD-10-CM

## 2022-10-13 ENCOUNTER — HOSPITAL ENCOUNTER (OUTPATIENT)
Dept: MAMMOGRAPHY | Age: 87
Discharge: HOME | End: 2022-10-13
Attending: FAMILY MEDICINE
Payer: MEDICARE

## 2022-10-13 DIAGNOSIS — Z12.31 VISIT FOR SCREENING MAMMOGRAM: ICD-10-CM

## 2022-10-13 PROCEDURE — 77063 BREAST TOMOSYNTHESIS BI: CPT

## 2022-10-20 ENCOUNTER — TRANSCRIBE ORDER (OUTPATIENT)
Dept: SCHEDULING | Age: 87
End: 2022-10-20

## 2022-10-20 DIAGNOSIS — R92.8 ABNORMAL MAMMOGRAM: Primary | ICD-10-CM

## 2022-10-21 ENCOUNTER — TRANSCRIBE ORDER (OUTPATIENT)
Dept: SCHEDULING | Age: 87
End: 2022-10-21

## 2022-10-21 DIAGNOSIS — R92.8 ABNORMAL MAMMOGRAM OF RIGHT BREAST: Primary | ICD-10-CM

## 2022-11-08 ENCOUNTER — HOSPITAL ENCOUNTER (OUTPATIENT)
Dept: MAMMOGRAPHY | Age: 87
Discharge: HOME | End: 2022-11-08
Attending: FAMILY MEDICINE
Payer: MEDICARE

## 2022-11-08 ENCOUNTER — HOSPITAL ENCOUNTER (OUTPATIENT)
Dept: ULTRASOUND IMAGING | Age: 87
Discharge: HOME | End: 2022-11-08
Attending: FAMILY MEDICINE
Payer: MEDICARE

## 2022-11-08 DIAGNOSIS — R92.8 ABNORMAL MAMMOGRAM OF RIGHT BREAST: ICD-10-CM

## 2022-11-08 DIAGNOSIS — R92.8 ABNORMAL MAMMOGRAM: ICD-10-CM

## 2022-11-08 PROCEDURE — 76642 ULTRASOUND BREAST LIMITED: CPT

## 2022-11-08 PROCEDURE — 77061 BREAST TOMOSYNTHESIS UNI: CPT

## 2023-01-04 DIAGNOSIS — I48.91 ATRIAL FIBRILLATION, UNSPECIFIED TYPE (HCC): ICD-10-CM

## 2023-01-10 DIAGNOSIS — I48.91 ATRIAL FIBRILLATION, UNSPECIFIED TYPE (HCC): ICD-10-CM

## 2023-02-22 ENCOUNTER — OFFICE VISIT (OUTPATIENT)
Age: 88
End: 2023-02-22
Payer: MEDICARE

## 2023-02-22 VITALS
WEIGHT: 136 LBS | BODY MASS INDEX: 25.68 KG/M2 | OXYGEN SATURATION: 97 % | DIASTOLIC BLOOD PRESSURE: 52 MMHG | HEIGHT: 61 IN | SYSTOLIC BLOOD PRESSURE: 122 MMHG | HEART RATE: 48 BPM

## 2023-02-22 DIAGNOSIS — I08.0 MITRAL AND AORTIC REGURGITATION: ICD-10-CM

## 2023-02-22 DIAGNOSIS — I48.20 CHRONIC ATRIAL FIBRILLATION, UNSPECIFIED (HCC): Primary | ICD-10-CM

## 2023-02-22 DIAGNOSIS — I50.32 CHRONIC DIASTOLIC (CONGESTIVE) HEART FAILURE (HCC): ICD-10-CM

## 2023-02-22 DIAGNOSIS — I10 ESSENTIAL (PRIMARY) HYPERTENSION: ICD-10-CM

## 2023-02-22 DIAGNOSIS — Z79.01 LONG TERM (CURRENT) USE OF ANTICOAGULANTS: ICD-10-CM

## 2023-02-22 PROCEDURE — 99214 OFFICE O/P EST MOD 30 MIN: CPT | Performed by: INTERNAL MEDICINE

## 2023-02-22 PROCEDURE — 1123F ACP DISCUSS/DSCN MKR DOCD: CPT | Performed by: INTERNAL MEDICINE

## 2023-02-22 RX ORDER — FLUTICASONE FUROATE AND VILANTEROL 200; 25 UG/1; UG/1
1 POWDER RESPIRATORY (INHALATION) DAILY
COMMUNITY
Start: 2023-01-04

## 2023-02-22 RX ORDER — LORATADINE 10 MG/1
10 CAPSULE, LIQUID FILLED ORAL DAILY
COMMUNITY

## 2023-02-22 RX ORDER — BENZONATATE 100 MG/1
CAPSULE ORAL
COMMUNITY
Start: 2023-02-03

## 2023-02-22 ASSESSMENT — PATIENT HEALTH QUESTIONNAIRE - PHQ9
1. LITTLE INTEREST OR PLEASURE IN DOING THINGS: 0
SUM OF ALL RESPONSES TO PHQ QUESTIONS 1-9: 0
DEPRESSION UNABLE TO ASSESS: FUNCTIONAL CAPACITY MOTIVATION LIMITS ACCURACY
SUM OF ALL RESPONSES TO PHQ QUESTIONS 1-9: 0
2. FEELING DOWN, DEPRESSED OR HOPELESS: 0
SUM OF ALL RESPONSES TO PHQ9 QUESTIONS 1 & 2: 0
SUM OF ALL RESPONSES TO PHQ QUESTIONS 1-9: 0
SUM OF ALL RESPONSES TO PHQ QUESTIONS 1-9: 0

## 2023-02-22 NOTE — PROGRESS NOTES
HISTORY OF PRESENT ILLNESS  Otilia Jon is a 80 y.o. female. Patient with a fib,htn,. On follow up patient denies any chest pains,sob, palpitation or other significant symptoms. 12/2021  Patient is here for follow-up. Since his last evaluation patient had interruption of Xarelto for planned endoscopy. 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 3/2022  Patient seen today for follow    Follow-up  Pertinent negatives include no chest pain, no abdominal pain, no headaches and no shortness of breath. CHF  Pertinent negatives include no chest pain, no abdominal pain, no headaches and no shortness of breath. Palpitations   The history is provided by the Patient. This is a chronic (AFib) problem. The problem occurs rarely. Associated symptoms include lower extremity edema. Pertinent negatives include no fever, no chest pain, no claudication, no orthopnea, no PND, no abdominal pain, no nausea, no vomiting, no headaches, no dizziness, no weakness, no cough, no hemoptysis, no shortness of breath and no sputum production. Leg Swelling  The history is provided by the Patient. This is a recurrent problem. Episode onset: 4-5/17. The problem occurs rarely. The problem has been resolved (since got diuretics from urgent care). Pertinent negatives include no chest pain, no abdominal pain, no headaches and no shortness of breath. The symptoms are aggravated by standing. The symptoms are relieved by sleep. Shortness of Breath  The history is provided by the Patient. This is a recurrent problem. The problem occurs intermittently. The current episode started more than 1 week ago. The problem has been resolved. Pertinent negatives include no fever, no headaches, no cough, no sputum production, no hemoptysis, no wheezing, no PND, no orthopnea, no chest pain, no vomiting, no abdominal pain, no rash, no leg swelling and no claudication. The problem's precipitants include exercise (steps).    Hospital Follow Up  Pertinent negatives include no chest pain, no abdominal pain, no headaches and no shortness of breath. Review of Systems   Constitutional:  Negative for chills and fever. HENT:  Negative for nosebleeds. Eyes:  Negative for blurred vision and double vision. Respiratory:  Negative for cough, hemoptysis, sputum production, shortness of breath and wheezing. Cardiovascular:  Positive for palpitations. Negative for chest pain, orthopnea, claudication, leg swelling and PND. Gastrointestinal:  Negative for abdominal pain, heartburn, nausea and vomiting. Musculoskeletal:  Negative for myalgias. Skin:  Negative for rash. Neurological:  Negative for dizziness, weakness and headaches. Endo/Heme/Allergies:  Does not bruise/bleed easily. Family History   Problem Relation Age of Onset    Heart Disease Neg Hx         no family history of heart disease       Past Medical History:   Diagnosis Date    Atrial fibrillation ([facility] [address]     Assessment: Maintaining S [place] on PO amiodarone pft followed by dr Emmy Aguirre. tsh/lft: mildly increased tsh, t4 normal, pf stable, rpt labs pending with pmd    CAD (coronary artery disease)     High cholesterol, a- fib    Chronic airway obstruction, not elsewhere classified 3/21/2013    Chronic lung disease     Essential hypertension, benign     Stable, edema better after receiving hctz    Hypertension     Other and unspecified hyperlipidemia     On Crestor and Fish Oil       Past Surgical History:   Procedure Laterality Date    HYSTERECTOMY (CERVIX STATUS UNKNOWN)      SPLENECTOMY         Social History     Tobacco Use    Smoking status: Never    Smokeless tobacco: Never   Substance Use Topics    Alcohol use: Yes       Allergies   Allergen Reactions    Lisinopril Cough     *Antihypertensives *    Codeine Other (See Comments)     Severe n/v    Piperacillin Sod-Tazobactam So Itching       Prior to Admission medications    Medication Sig Start Date End Date Taking? Authorizing Provider   fluticasone furoate-vilanterol (BREO ELLIPTA) 200-25 MCG/ACT AEPB inhaler Inhale 1 puff into the lungs daily 1/4/23  Yes Historical Provider, MD   benzonatate (TESSALON) 100 MG capsule Take 1 capsule 3 times daily as needed for coughing, swallow capsules whole. 2/3/23  Yes Historical Provider, MD   loratadine (CLARITIN) 10 MG capsule Take 10 mg by mouth daily   Yes Historical Provider, MD   albuterol sulfate HFA (PROVENTIL;VENTOLIN;PROAIR) 108 (90 Base) MCG/ACT inhaler Inhale 2 puffs into the lungs 3 times daily as needed   Yes Ar Automatic Reconciliation   atorvastatin (LIPITOR) 20 MG tablet TAKE ONE TABLET BY MOUTH DAILY 10/29/20  Yes Ar Automatic Reconciliation   dilTIAZem (TIAZAC) 180 MG extended release capsule TAKE ONE CAPSULE BY MOUTH DAILY 4/19/22  Yes Ar Automatic Reconciliation   fluticasone (FLONASE) 50 MCG/ACT nasal spray 1 spray by Nasal route daily as needed   Yes Ar Automatic Reconciliation   olmesartan-hydroCHLOROthiazide (BENICAR HCT) 40-25 MG per tablet Take 1 tablet by mouth daily 3/15/22  Yes Ar Automatic Reconciliation   rivaroxaban (XARELTO) 20 MG TABS tablet TAKE ONE TABLET BY MOUTH DAILY. TAKE WITH DINNER. 9/9/22  Yes Ar Automatic Reconciliation         BP (!) 122/52 (Site: Left Upper Arm, Position: Sitting, Cuff Size: Medium Adult)   Pulse (!) 48   Ht 5' 1\" (1.549 m)   Wt 136 lb (61.7 kg)   SpO2 97%   BMI 25.70 kg/m²     Physical Exam  Constitutional:       Appearance: She is well-developed. HENT:      Head: Normocephalic and atraumatic. Eyes:      Conjunctiva/sclera: Conjunctivae normal.   Neck:      Thyroid: No thyromegaly. Vascular: No JVD. Trachea: No tracheal deviation. Cardiovascular:      Rate and Rhythm: Normal rate. Rhythm irregularly irregular. Chest Wall: PMI is not displaced. Pulses: No decreased pulses. Heart sounds: No murmur heard. No gallop. No S3 sounds. Pulmonary:      Effort: No respiratory distress. Breath sounds: No wheezing or rales. Chest:      Chest wall: No tenderness. Abdominal:      Palpations: Abdomen is soft. Tenderness: There is no abdominal tenderness. Musculoskeletal:      Cervical back: Neck supple. Skin:     General: Skin is warm. Neurological:      Mental Status: She is alert and oriented to person, place, and time. Ms. Srinivas Arriola has a reminder for a \"due or due soon\" health maintenance. I have asked that she contact her primary care provider for follow-up on this health maintenance. I Have personally reviewed recent relevant labs available and discussed with patient  3/2016-tsh -high-repeat free t4  lft-normal  No flowsheet data found. I have discussed risk benefit and option of use of amiodarone for arrythmia. Risk of toxicity with medication are informed. Patient will require careful monitoring.  ekg  A fib-4/2017  SUMMARY:4/2017  Left ventricle: Systolic function was normal. Ejection fraction was  estimated to be 55 %. There were no regional wall motion abnormalities. Features were consistent with a pseudonormal left ventricular filling  pattern, with concomitant abnormal relaxation and increased filling  pressure (grade 2 diastolic dysfunction). Left atrium: The atrium was mildly dilated. Right atrium: The atrium was dilated. Mitral valve: There was mild annular calcification. There was mild  regurgitation. Aortic valve: There was mild regurgitation. 4/2017  holter-a fib    4/2017  pft-minimal obstructive and mild diffusion defect-no change 2012            I Have personally reviewed recent relevant labs available and discussed with patient  3/2018  3/2019-digoxin-0.9  720-digoxin-1.2  1/2021-digoxin and hemoglobin-dig level 0.5  Interpretation Summary 8/2020    LV: Normal cavity size, wall thickness and systolic function (ejection fraction normal). Estimated left ventricular ejection fraction is 60 - 65%.  Wall motion: normal. Inconclusive left ventricular diastolic function. LA: Moderately dilated left atrium. Left Atrium volume index is 46.67 mL/m2. RA: Mildly dilated right atrium. AV: Mild aortic valve regurgitation is present. MV: Mitral valve thickening. Mild mitral valve regurgitation is present. TV: Mild tricuspid valve regurgitation is present. PA: Mild pulmonary hypertension. Pulmonary arterial systolic pressure is 42 mmHg. 7/2021-abdominal CT    1. 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 Critical result called to Dr. Annalisa Colon at 8:55 AM on 7/15/2021.     2. Left renal cortical scarring and subcentimeter hypodensity too small to characterize. 3. Mesenteric fluid density, which can reflect mesenteric cyst or entrapment intraperitoneal free fluid. No surrounding inflammatory changes. Cholelithiasis without CT evident cholecystitis. Signed By: Angelina Malik MD on 7/15/2021 9:07 AM    7/2021  CONCLUSIONS     * Left ventricular chamber volume is normal.     * Left ventricular wall thickness is normal.     * Left ventricular systolic function is normal with an ejection fraction of   65 % by visual estimation. * Left ventricular diastolic function: indeterminate. * Right ventricular chamber dimension is mildly enlarged. * Right ventricular systolic function is normal with TAPSE measuring 1.87   cm. * Left atrial chamber is mildly enlarged with a left atrial volume index of   37.70 ml/m^2 by BP MOD. * The aortic root at the sinus of Valsalva is normal measuring 3.6 cm with   an index of 2.24 cm/m2. * Right atrial chamber volume is enlarged. * Mild aortic, mitral and tricuspid regurgitation. * Mild pulmonary hypertension, estimated pulmonary arterial systolic   pressure is 41 mmHg. * Bubble study was performed and was negative for shunt.        Echo Cardiogram Complete    Anatomical Region Laterality Modality   -- -- Color Flow Doppler       Narrative 7/2022    CONCLUSIONS     * Left ventricular chamber size, wall thickness, and systolic function are   normal with no regional wall motion abnormalities. * Left ventricular systolic function is normal with an ejection fraction of   60 % by visual estimation. * Unable to accurately assess left ventricular diastolic function due to   arrhythmia. * Right ventricular chamber dimension is mildly enlarged with normal   systolic function. .     * Right atrial chamber size is mildly enlarged. * Left atrial chamber is borderline enlarged with a left atrial volume index   of 34.66 ml/m^2 by BP MOD. * Mild MR & AI. Moderate TR.     * RVSP ~ 64 mmHg. * No mass, shunts, or thrombi. No flowsheet data found. Assessment        Diagnosis Orders   1. Chronic atrial fibrillation, unspecified ([address]      Stable continue treatment monitor      2. Chronic diastolic (congestive) heart failure (HCC)      Stable compensated continue treatment      3. Essential (primary) hypertension      Stable monitor      4. Long term (current) use of anticoagulants      Continue for A-fib      5. Mitral and aortic regurgitation      Stable continue current treatment          There are no discontinued medications. Follow-up and Dispositions    Return in about 6 months (around 8/22/2023). 4/2017-recurrent A. fib-amiodarone stopped-has tried multiple other meds in past  5/2017-discussed options of ablation -wants to continue rate control startegy  5/2018  A fib-rate control and anticoagulation  11/2019  Cardiac status stable. Lipitor increased to 20 mg as LDL was mildly elevated. Digoxin level was 0.6.  9/2019 continue current therapy  9/2020  Cardiac status stable. 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 Back on Xarelto.   Patient will need Lovenox bridging in future if Xarelto is going to be interrupted cardiac status stable continue current medical management monitor. Blood pressure mildly elevated will defer adjustment per renal  3/2022  Severe uncontrolled hypertension. Discontinue losartan. Change to olmesartan HCTZ. At next visit consider increasing diltiazem if heart rate is acceptable. I have discontinued digoxin as she has bradycardia  4/2022  Cardiac status is significantly better. Blood pressure much better controlled continue current treatment with diltiazem and olmesartan HCTZ. 8/2022  Seen after recent admission in July 2022 with acute exacerbation of COPD and respiratory failure. At that time she was in A. fib in the hospital.  At follow-up she feels significantly better rate is better controlled. Blood pressure stable.   Continue current treatment  2/2023  Cardiac status remains stable continue current medical management monitor

## 2023-03-08 DIAGNOSIS — I10 ESSENTIAL (PRIMARY) HYPERTENSION: Primary | ICD-10-CM

## 2023-03-08 RX ORDER — OLMESARTAN MEDOXOMIL AND HYDROCHLOROTHIAZIDE 40/25 40; 25 MG/1; MG/1
1 TABLET ORAL DAILY
Qty: 90 TABLET | Refills: 3 | Status: SHIPPED | OUTPATIENT
Start: 2023-03-08

## 2023-03-08 NOTE — TELEPHONE ENCOUNTER
Requested Prescriptions     Pending Prescriptions Disp Refills    olmesartan-hydroCHLOROthiazide (BENICAR HCT) 40-25 MG per tablet 90 tablet 3     Sig: Take 1 tablet by mouth daily

## 2023-05-22 ENCOUNTER — HOSPITAL ENCOUNTER (OUTPATIENT)
Facility: HOSPITAL | Age: 88
Discharge: HOME | End: 2023-05-25
Payer: MEDICARE

## 2023-05-22 DIAGNOSIS — R92.8 ABNORMAL MAMMOGRAM: ICD-10-CM

## 2023-05-22 PROCEDURE — 76642 ULTRASOUND BREAST LIMITED: CPT

## 2023-08-23 ENCOUNTER — OFFICE VISIT (OUTPATIENT)
Age: 88
End: 2023-08-23
Payer: MEDICARE

## 2023-08-23 VITALS
WEIGHT: 135 LBS | HEIGHT: 61 IN | BODY MASS INDEX: 25.49 KG/M2 | SYSTOLIC BLOOD PRESSURE: 128 MMHG | DIASTOLIC BLOOD PRESSURE: 52 MMHG | OXYGEN SATURATION: 97 % | HEART RATE: 54 BPM

## 2023-08-23 DIAGNOSIS — I50.32 CHRONIC DIASTOLIC (CONGESTIVE) HEART FAILURE (HCC): ICD-10-CM

## 2023-08-23 DIAGNOSIS — I48.20 CHRONIC ATRIAL FIBRILLATION, UNSPECIFIED (HCC): Primary | ICD-10-CM

## 2023-08-23 DIAGNOSIS — I10 ESSENTIAL (PRIMARY) HYPERTENSION: ICD-10-CM

## 2023-08-23 DIAGNOSIS — I08.0 MITRAL AND AORTIC REGURGITATION: ICD-10-CM

## 2023-08-23 DIAGNOSIS — Z79.01 LONG TERM (CURRENT) USE OF ANTICOAGULANTS: ICD-10-CM

## 2023-08-23 PROCEDURE — 99214 OFFICE O/P EST MOD 30 MIN: CPT | Performed by: INTERNAL MEDICINE

## 2023-08-23 PROCEDURE — 1123F ACP DISCUSS/DSCN MKR DOCD: CPT | Performed by: INTERNAL MEDICINE

## 2023-08-23 RX ORDER — HYDRALAZINE HYDROCHLORIDE 25 MG/1
25 TABLET, FILM COATED ORAL 3 TIMES DAILY
Qty: 90 TABLET | Refills: 3 | Status: SHIPPED | OUTPATIENT
Start: 2023-08-23

## 2023-08-23 ASSESSMENT — PATIENT HEALTH QUESTIONNAIRE - PHQ9
DEPRESSION UNABLE TO ASSESS: FUNCTIONAL CAPACITY MOTIVATION LIMITS ACCURACY
SUM OF ALL RESPONSES TO PHQ QUESTIONS 1-9: 0
2. FEELING DOWN, DEPRESSED OR HOPELESS: 0
SUM OF ALL RESPONSES TO PHQ9 QUESTIONS 1 & 2: 0
1. LITTLE INTEREST OR PLEASURE IN DOING THINGS: 0
SUM OF ALL RESPONSES TO PHQ QUESTIONS 1-9: 0

## 2023-09-06 ENCOUNTER — TELEPHONE (OUTPATIENT)
Age: 88
End: 2023-09-06

## 2023-09-06 DIAGNOSIS — I48.20 CHRONIC ATRIAL FIBRILLATION, UNSPECIFIED (HCC): Primary | ICD-10-CM

## 2023-09-06 DIAGNOSIS — I50.32 CHRONIC DIASTOLIC (CONGESTIVE) HEART FAILURE (HCC): ICD-10-CM

## 2023-09-06 RX ORDER — OLMESARTAN MEDOXOMIL AND HYDROCHLOROTHIAZIDE 40/25 40; 25 MG/1; MG/1
1 TABLET ORAL DAILY
COMMUNITY

## 2023-09-06 NOTE — TELEPHONE ENCOUNTER
Patient aware of getting labs done before follow up. She voices understanding and acceptance of this advice and will call back if any further questions or concerns.

## 2023-09-06 NOTE — TELEPHONE ENCOUNTER
Patient states you change BP medicine form Olmesartan to Hydralazine and he rfeet has been swelling. Patient had appointment with PCP and states she need diuretic and to call you and advise d/t taking her off Olmesartan that had one.  Please advise · BNP is elevated, Troponin at 0 05-->0 04  · Appears euvolemic on exam, CXR negative for acute cardiopulmonary disease  · Cardiology stopped all diuretics in past   · Echo from 2017 shows EF 55%, no regional wall motion abnormalities  · Monitor

## 2023-09-06 NOTE — TELEPHONE ENCOUNTER
sPoke with patient and she states she still have the cough and it worse than before. She saw her PCP and lung doctor and was given inhaler. She will go back to taking Olmesartan and d/c hydralazine. Please advise if you want her to have BMP done before next follow up with Wilda Swann.

## 2023-09-21 LAB
ANION GAP SERPL CALCULATED.3IONS-SCNC: 14 MMOL/L (ref 3–15)
BUN BLDV-MCNC: 9 MG/DL (ref 6–22)
CALCIUM SERPL-MCNC: 9.6 MG/DL (ref 8.4–10.5)
CHLORIDE BLD-SCNC: 101 MMOL/L (ref 98–110)
CO2: 24 MMOL/L (ref 20–32)
CREAT SERPL-MCNC: 0.9 MG/DL (ref 0.8–1.4)
GLOMERULAR FILTRATION RATE: 58.2 ML/MIN/1.73 SQ.M.
GLUCOSE: 82 MG/DL (ref 70–99)
POTASSIUM SERPL-SCNC: 4.6 MMOL/L (ref 3.5–5.5)
SODIUM BLD-SCNC: 139 MMOL/L (ref 133–145)

## 2023-09-22 ENCOUNTER — TELEPHONE (OUTPATIENT)
Age: 88
End: 2023-09-22

## 2023-09-22 ENCOUNTER — TRANSCRIBE ORDERS (OUTPATIENT)
Facility: HOSPITAL | Age: 88
End: 2023-09-22

## 2023-09-22 DIAGNOSIS — Z12.31 SCREENING MAMMOGRAM FOR HIGH-RISK PATIENT: Primary | ICD-10-CM

## 2023-09-22 NOTE — TELEPHONE ENCOUNTER
Spoke to patient per Dr. Huang Duff regarding lab/test. Lab reviewed. No significant abnormality. She voices understanding and acceptance of this advice and will call back if any further questions or concerns.

## 2023-09-22 NOTE — TELEPHONE ENCOUNTER
----- Message from Jeffry Flores MD sent at 9/22/2023  7:23 AM EDT -----  Lab/test  reviewed  No significant abnormality

## 2023-09-28 ENCOUNTER — OFFICE VISIT (OUTPATIENT)
Age: 88
End: 2023-09-28
Payer: MEDICARE

## 2023-09-28 VITALS
SYSTOLIC BLOOD PRESSURE: 128 MMHG | DIASTOLIC BLOOD PRESSURE: 58 MMHG | HEIGHT: 61 IN | WEIGHT: 133.8 LBS | HEART RATE: 47 BPM | BODY MASS INDEX: 25.26 KG/M2 | OXYGEN SATURATION: 96 %

## 2023-09-28 DIAGNOSIS — E78.2 MIXED HYPERLIPIDEMIA: ICD-10-CM

## 2023-09-28 DIAGNOSIS — I48.20 CHRONIC ATRIAL FIBRILLATION, UNSPECIFIED (HCC): Primary | ICD-10-CM

## 2023-09-28 DIAGNOSIS — Z79.01 LONG TERM (CURRENT) USE OF ANTICOAGULANTS: ICD-10-CM

## 2023-09-28 DIAGNOSIS — I50.32 CHRONIC DIASTOLIC (CONGESTIVE) HEART FAILURE (HCC): ICD-10-CM

## 2023-09-28 DIAGNOSIS — I10 ESSENTIAL (PRIMARY) HYPERTENSION: ICD-10-CM

## 2023-09-28 DIAGNOSIS — I08.0 MITRAL AND AORTIC REGURGITATION: ICD-10-CM

## 2023-09-28 PROCEDURE — 93000 ELECTROCARDIOGRAM COMPLETE: CPT | Performed by: NURSE PRACTITIONER

## 2023-09-28 PROCEDURE — 99214 OFFICE O/P EST MOD 30 MIN: CPT | Performed by: NURSE PRACTITIONER

## 2023-09-28 PROCEDURE — 1123F ACP DISCUSS/DSCN MKR DOCD: CPT | Performed by: NURSE PRACTITIONER

## 2023-09-28 RX ORDER — HYDRALAZINE HYDROCHLORIDE 25 MG/1
25 TABLET, FILM COATED ORAL 3 TIMES DAILY
COMMUNITY

## 2023-09-28 ASSESSMENT — PATIENT HEALTH QUESTIONNAIRE - PHQ9
SUM OF ALL RESPONSES TO PHQ QUESTIONS 1-9: 0
SUM OF ALL RESPONSES TO PHQ9 QUESTIONS 1 & 2: 0
SUM OF ALL RESPONSES TO PHQ QUESTIONS 1-9: 0
SUM OF ALL RESPONSES TO PHQ QUESTIONS 1-9: 0
2. FEELING DOWN, DEPRESSED OR HOPELESS: 0
SUM OF ALL RESPONSES TO PHQ QUESTIONS 1-9: 0
1. LITTLE INTEREST OR PLEASURE IN DOING THINGS: 0

## 2023-09-28 NOTE — PROGRESS NOTES
1. Have you been to the ER, urgent care clinic since your last visit? Hospitalized since your last visit?     no    2. Have you seen or consulted any other health care providers outside of the [address] [place] [address] since your last visit? Include any pap smears or colon screening.       Yes pcp

## 2023-09-28 NOTE — PROGRESS NOTES
HISTORY OF PRESENT ILLNESS  Palma Padilla is a 80 y.o. female. Patient with a fib,htn,. On follow up patient denies any chest pains,sob, palpitation or other significant symptoms. 12/2021  Patient is here for follow-up. Since his last evaluation patient had interruption of Xarelto for planned endoscopy. 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 3/2022  Patient seen today for follow    Follow-up  Pertinent negatives include no chest pain, no abdominal pain, no headaches and no shortness of breath. CHF  Pertinent negatives include no chest pain, no abdominal pain, no headaches and no shortness of breath. Palpitations   The history is provided by the Patient. This is a chronic (AFib) problem. The problem occurs rarely. Associated symptoms include lower extremity edema. Pertinent negatives include no fever, no chest pain, no claudication, no orthopnea, no PND, no abdominal pain, no nausea, no vomiting, no headaches, no dizziness, no weakness, no cough, no hemoptysis, no shortness of breath and no sputum production. Leg Swelling  The history is provided by the Patient. This is a recurrent problem. Episode onset: 4-5/17. The problem occurs rarely. The problem has been resolved (since got diuretics from urgent care). Pertinent negatives include no chest pain, no abdominal pain, no headaches and no shortness of breath. The symptoms are aggravated by standing. The symptoms are relieved by sleep. Shortness of Breath  The history is provided by the Patient. This is a recurrent problem. The problem occurs intermittently. The current episode started more than 1 week ago. The problem has been resolved. Pertinent negatives include no fever, no headaches, no cough, no sputum production, no hemoptysis, no wheezing, no PND, no orthopnea, no chest pain, no vomiting, no abdominal pain, no rash, no leg swelling and no claudication. The problem's precipitants include exercise (steps).    Hospital

## 2023-09-28 NOTE — PATIENT INSTRUCTIONS
After the recommended changes have been made in blood pressure medicines, patient advised to keep BP/HR(pulse rate) chart twice daily and bring us results in next 4 to 5 days. Patient may send the results via \"My Chart\" if desired. Please rest for 5-10 minutes before checking blood pressure. Sit on a comfortable chair without crossing the legs and put your arm on a table. We recommend that you use an upper arm cuff. Check the blood pressure 3 times each time you check the blood pressure and record the lowest reading. If you check the blood pressure in both arms, use the higher reading.

## 2023-10-27 DIAGNOSIS — I10 ESSENTIAL (PRIMARY) HYPERTENSION: ICD-10-CM

## 2023-10-27 DIAGNOSIS — I48.20 CHRONIC ATRIAL FIBRILLATION, UNSPECIFIED (HCC): Primary | ICD-10-CM

## 2023-10-27 RX ORDER — HYDRALAZINE HYDROCHLORIDE 25 MG/1
25 TABLET, FILM COATED ORAL 3 TIMES DAILY
Qty: 90 TABLET | Refills: 3 | Status: SHIPPED | OUTPATIENT
Start: 2023-10-27

## 2023-10-27 NOTE — TELEPHONE ENCOUNTER
Requested Prescriptions     Pending Prescriptions Disp Refills    hydrALAZINE (APRESOLINE) 25 MG tablet 90 tablet 3     Sig: Take 1 tablet by mouth 3 times daily    rivaroxaban (XARELTO) 20 MG TABS tablet 90 tablet 3     Sig: Take 1 tablet by mouth daily (with breakfast)

## 2023-11-14 ENCOUNTER — OFFICE VISIT (OUTPATIENT)
Age: 88
End: 2023-11-14
Payer: MEDICARE

## 2023-11-14 VITALS
OXYGEN SATURATION: 97 % | HEART RATE: 73 BPM | HEIGHT: 61 IN | WEIGHT: 143 LBS | BODY MASS INDEX: 27 KG/M2 | DIASTOLIC BLOOD PRESSURE: 71 MMHG | SYSTOLIC BLOOD PRESSURE: 152 MMHG

## 2023-11-14 DIAGNOSIS — I48.20 CHRONIC ATRIAL FIBRILLATION, UNSPECIFIED (HCC): Primary | ICD-10-CM

## 2023-11-14 DIAGNOSIS — Z79.01 LONG TERM (CURRENT) USE OF ANTICOAGULANTS: ICD-10-CM

## 2023-11-14 DIAGNOSIS — I10 ESSENTIAL (PRIMARY) HYPERTENSION: ICD-10-CM

## 2023-11-14 DIAGNOSIS — I27.20 PULMONARY HYPERTENSION (HCC): ICD-10-CM

## 2023-11-14 DIAGNOSIS — I50.32 CHRONIC DIASTOLIC (CONGESTIVE) HEART FAILURE (HCC): ICD-10-CM

## 2023-11-14 DIAGNOSIS — I08.0 MITRAL AND AORTIC REGURGITATION: ICD-10-CM

## 2023-11-14 DIAGNOSIS — I48.20 CHRONIC ATRIAL FIBRILLATION, UNSPECIFIED (HCC): ICD-10-CM

## 2023-11-14 PROCEDURE — 1123F ACP DISCUSS/DSCN MKR DOCD: CPT | Performed by: INTERNAL MEDICINE

## 2023-11-14 PROCEDURE — 99214 OFFICE O/P EST MOD 30 MIN: CPT | Performed by: INTERNAL MEDICINE

## 2023-11-14 RX ORDER — FLUTICASONE FUROATE, UMECLIDINIUM BROMIDE AND VILANTEROL TRIFENATATE 100; 62.5; 25 UG/1; UG/1; UG/1
POWDER RESPIRATORY (INHALATION)
COMMUNITY
Start: 2023-09-19

## 2023-11-14 RX ORDER — FUROSEMIDE 40 MG/1
40 TABLET ORAL DAILY
Qty: 90 TABLET | Refills: 3 | Status: SHIPPED | OUTPATIENT
Start: 2023-11-14

## 2023-11-14 ASSESSMENT — PATIENT HEALTH QUESTIONNAIRE - PHQ9
SUM OF ALL RESPONSES TO PHQ QUESTIONS 1-9: 0
SUM OF ALL RESPONSES TO PHQ9 QUESTIONS 1 & 2: 0
DEPRESSION UNABLE TO ASSESS: FUNCTIONAL CAPACITY MOTIVATION LIMITS ACCURACY
2. FEELING DOWN, DEPRESSED OR HOPELESS: 0
1. LITTLE INTEREST OR PLEASURE IN DOING THINGS: 0
SUM OF ALL RESPONSES TO PHQ QUESTIONS 1-9: 0

## 2023-11-14 NOTE — PROGRESS NOTES
1. Have you been to the ER, urgent care clinic since your last visit? Hospitalized since your last visit? No    2. Have you seen or consulted any other health care providers outside of the [address] [place] [address] since your last visit? Include any pap smears or colon screening.       No
pressure mildly elevated monitor.   Follow-up echo

## 2023-11-17 ENCOUNTER — HOSPITAL ENCOUNTER (OUTPATIENT)
Facility: HOSPITAL | Age: 88
End: 2023-11-17
Payer: MEDICARE

## 2023-11-17 ENCOUNTER — TELEPHONE (OUTPATIENT)
Age: 88
End: 2023-11-17

## 2023-11-17 VITALS — BODY MASS INDEX: 27.01 KG/M2 | HEIGHT: 61 IN | WEIGHT: 143.08 LBS

## 2023-11-17 DIAGNOSIS — R92.8 ABNORMAL MAMMOGRAM: ICD-10-CM

## 2023-11-17 PROCEDURE — G0279 TOMOSYNTHESIS, MAMMO: HCPCS

## 2023-11-17 PROCEDURE — 76642 ULTRASOUND BREAST LIMITED: CPT

## 2023-11-17 NOTE — TELEPHONE ENCOUNTER
Patient stated that she has been taking the furosemide 40mg and they are making her feel dizzy and losing her balance this happened wed and Thursday she stated she did not take the medicine today and she is not having any of those symptoms.

## 2023-11-20 NOTE — TELEPHONE ENCOUNTER
Spoke with patient and she regarding her symptoms of dizziness and being off balance. She states he is still off balance and dizzy and she don't think its her Lasix now. She will be going to see her PCP on Wednesday and she will start back taking Lasix. She voices understanding and acceptance of this advice and will call back if any further questions or concerns.

## 2023-12-23 DIAGNOSIS — I10 ESSENTIAL (PRIMARY) HYPERTENSION: ICD-10-CM

## 2023-12-27 RX ORDER — HYDRALAZINE HYDROCHLORIDE 25 MG/1
25 TABLET, FILM COATED ORAL 3 TIMES DAILY
Qty: 90 TABLET | Refills: 3 | Status: SHIPPED | OUTPATIENT
Start: 2023-12-27 | End: 2023-12-28 | Stop reason: SDUPTHER

## 2023-12-28 ENCOUNTER — OFFICE VISIT (OUTPATIENT)
Age: 88
End: 2023-12-28
Payer: MEDICARE

## 2023-12-28 VITALS
SYSTOLIC BLOOD PRESSURE: 160 MMHG | OXYGEN SATURATION: 97 % | DIASTOLIC BLOOD PRESSURE: 81 MMHG | WEIGHT: 136 LBS | HEIGHT: 60 IN | HEART RATE: 78 BPM | BODY MASS INDEX: 26.7 KG/M2

## 2023-12-28 DIAGNOSIS — I10 ESSENTIAL (PRIMARY) HYPERTENSION: ICD-10-CM

## 2023-12-28 DIAGNOSIS — I27.20 PULMONARY HYPERTENSION (HCC): ICD-10-CM

## 2023-12-28 DIAGNOSIS — R42 VERTIGO: ICD-10-CM

## 2023-12-28 DIAGNOSIS — I08.0 MITRAL AND AORTIC REGURGITATION: ICD-10-CM

## 2023-12-28 DIAGNOSIS — Z79.01 LONG TERM (CURRENT) USE OF ANTICOAGULANTS: ICD-10-CM

## 2023-12-28 DIAGNOSIS — I48.20 CHRONIC ATRIAL FIBRILLATION, UNSPECIFIED (HCC): Primary | ICD-10-CM

## 2023-12-28 DIAGNOSIS — I50.32 CHRONIC DIASTOLIC (CONGESTIVE) HEART FAILURE (HCC): ICD-10-CM

## 2023-12-28 PROCEDURE — 99214 OFFICE O/P EST MOD 30 MIN: CPT | Performed by: NURSE PRACTITIONER

## 2023-12-28 PROCEDURE — 1123F ACP DISCUSS/DSCN MKR DOCD: CPT | Performed by: NURSE PRACTITIONER

## 2023-12-28 RX ORDER — MECLIZINE HYDROCHLORIDE 25 MG/1
25 TABLET ORAL 3 TIMES DAILY PRN
Qty: 25 TABLET | Refills: 0 | Status: SHIPPED | OUTPATIENT
Start: 2023-12-28

## 2023-12-28 RX ORDER — HYDRALAZINE HYDROCHLORIDE 50 MG/1
50 TABLET, FILM COATED ORAL 3 TIMES DAILY
Qty: 90 TABLET | Refills: 2 | Status: SHIPPED | OUTPATIENT
Start: 2023-12-28

## 2023-12-28 NOTE — PROGRESS NOTES
1. Have you been to the ER, urgent care clinic since your last visit? Hospitalized since your last visit? Yes niyah for vertigo    2. Have you seen or consulted any other health care providers outside of the [address] [place] [address] Avenue since your last visit? Include any pap smears or colon screening.       Yes pcp
MG tablet      8. Vertigo  meclizine (ANTIVERT) 25 MG tablet          Medications Discontinued During This Encounter   Medication Reason    fluticasone furoate-vilanterol (BREO ELLIPTA) 200-25 MCG/ACT AEPB inhaler Therapy completed    hydrALAZINE (APRESOLINE) 25 MG tablet REORDER         4/2017-recurrent A. fib-amiodarone stopped-has tried multiple other meds in past  5/2017-discussed options of ablation -wants to continue rate control startegy  5/2018  A fib-rate control and anticoagulation  11/2019  Cardiac status stable. Lipitor increased to 20 mg as LDL was mildly elevated. Digoxin level was 0.6.  9/2019 continue current therapy  9/2020  Cardiac status stable. 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 Back on Xarelto. Patient will need Lovenox bridging in future if Xarelto is going to be interrupted cardiac status stable continue current medical management monitor. Blood pressure mildly elevated will defer adjustment per renal  3/2022  Severe uncontrolled hypertension. Discontinue losartan. Change to olmesartan HCTZ. At next visit consider increasing diltiazem if heart rate is acceptable. I have discontinued digoxin as she has bradycardia  4/2022  Cardiac status is significantly better. Blood pressure much better controlled continue current treatment with diltiazem and olmesartan HCTZ. 8/2022  Seen after recent admission in July 2022 with acute exacerbation of COPD and respiratory failure. At that time she was in A. fib in the hospital.  At follow-up she feels significantly better rate is better controlled. Blood pressure stable. Continue current treatment  2/2023  Cardiac status remains stable continue current medical management monitor  8/2023  Cardiac status stable. Rate controlled continue treatment tolerating anticoagulation  Hacking cough for few months.   Possibly from olmesartan patient had cough with

## 2024-03-27 DIAGNOSIS — I10 ESSENTIAL (PRIMARY) HYPERTENSION: ICD-10-CM

## 2024-03-27 RX ORDER — HYDRALAZINE HYDROCHLORIDE 50 MG/1
50 TABLET, FILM COATED ORAL 3 TIMES DAILY
Qty: 90 TABLET | Refills: 2 | Status: SHIPPED | OUTPATIENT
Start: 2024-03-27

## 2024-06-23 DIAGNOSIS — I10 ESSENTIAL (PRIMARY) HYPERTENSION: ICD-10-CM

## 2024-06-24 RX ORDER — HYDRALAZINE HYDROCHLORIDE 50 MG/1
50 TABLET, FILM COATED ORAL 3 TIMES DAILY
Qty: 270 TABLET | Refills: 3 | Status: SHIPPED | OUTPATIENT
Start: 2024-06-24

## 2024-07-11 ENCOUNTER — OFFICE VISIT (OUTPATIENT)
Age: 89
End: 2024-07-11
Payer: MEDICARE

## 2024-07-11 VITALS
SYSTOLIC BLOOD PRESSURE: 177 MMHG | BODY MASS INDEX: 26.31 KG/M2 | HEIGHT: 60 IN | WEIGHT: 134 LBS | HEART RATE: 75 BPM | DIASTOLIC BLOOD PRESSURE: 89 MMHG

## 2024-07-11 DIAGNOSIS — R42 VERTIGO: ICD-10-CM

## 2024-07-11 DIAGNOSIS — I10 ESSENTIAL (PRIMARY) HYPERTENSION: ICD-10-CM

## 2024-07-11 DIAGNOSIS — I08.0 MITRAL AND AORTIC REGURGITATION: ICD-10-CM

## 2024-07-11 DIAGNOSIS — I27.20 PULMONARY HYPERTENSION (HCC): ICD-10-CM

## 2024-07-11 DIAGNOSIS — I48.20 CHRONIC ATRIAL FIBRILLATION, UNSPECIFIED (HCC): Primary | ICD-10-CM

## 2024-07-11 DIAGNOSIS — Z79.01 LONG TERM (CURRENT) USE OF ANTICOAGULANTS: ICD-10-CM

## 2024-07-11 DIAGNOSIS — I50.32 CHRONIC DIASTOLIC (CONGESTIVE) HEART FAILURE (HCC): ICD-10-CM

## 2024-07-11 PROCEDURE — 1123F ACP DISCUSS/DSCN MKR DOCD: CPT | Performed by: NURSE PRACTITIONER

## 2024-07-11 PROCEDURE — 99214 OFFICE O/P EST MOD 30 MIN: CPT | Performed by: NURSE PRACTITIONER

## 2024-07-11 RX ORDER — FUROSEMIDE 40 MG/1
20 TABLET ORAL DAILY
Qty: 90 TABLET | Refills: 3
Start: 2024-07-11

## 2024-07-11 RX ORDER — MECLIZINE HYDROCHLORIDE 25 MG/1
25 TABLET ORAL 3 TIMES DAILY PRN
Qty: 25 TABLET | Refills: 0 | Status: SHIPPED | OUTPATIENT
Start: 2024-07-11

## 2024-07-11 ASSESSMENT — PATIENT HEALTH QUESTIONNAIRE - PHQ9
1. LITTLE INTEREST OR PLEASURE IN DOING THINGS: NOT AT ALL
DEPRESSION UNABLE TO ASSESS: FUNCTIONAL CAPACITY MOTIVATION LIMITS ACCURACY
SUM OF ALL RESPONSES TO PHQ QUESTIONS 1-9: 0
SUM OF ALL RESPONSES TO PHQ9 QUESTIONS 1 & 2: 0
2. FEELING DOWN, DEPRESSED OR HOPELESS: NOT AT ALL

## 2024-07-11 NOTE — PROGRESS NOTES
1. Have you been to the ER, urgent care clinic since your last visit?  Hospitalized since your last visit?     No    2. Have you seen or consulted any other health care providers outside of the [facility] since your last visit?  Include any pap smears or colon screening.      No     
fib-amiodarone stopped-has tried multiple other meds in past  5/2017-discussed options of ablation -wants to continue rate control startegy  5/2018  A fib-rate control and anticoagulation  11/2019  Cardiac status stable.  Lipitor increased to 20 mg as LDL was mildly elevated.  Digoxin level was 0.6.  9/2019 continue current therapy  9/2020  Cardiac status stable.  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  Back on Xarelto.  Patient will need Lovenox bridging in future if Xarelto is going to be interrupted cardiac status stable continue current medical management monitor.  Blood pressure mildly elevated will defer adjustment per renal  3/2022  Severe uncontrolled hypertension.  Discontinue losartan.  Change to olmesartan HCTZ.  At next visit consider increasing diltiazem if heart rate is acceptable.  I have discontinued digoxin as she has bradycardia  4/2022  Cardiac status is significantly better.  Blood pressure much better controlled continue current treatment with diltiazem and olmesartan HCTZ.  8/2022  Seen after recent admission in July 2022 with acute exacerbation of COPD and respiratory failure.  At that time she was in A. fib in the hospital.  At follow-up she feels significantly better rate is better controlled.  Blood pressure stable.  Continue current treatment  2/2023  Cardiac status remains stable continue current medical management monitor  8/2023  Cardiac status stable.  Rate controlled continue treatment tolerating anticoagulation  Hacking cough for few months.  Possibly from olmesartan patient had cough with lisinopril in past.  We will discontinue.  Monitor blood pressure.  Change to hydralazine.  9/2023  Cardiac status is stable.  In office EKG Afib with slow RVR.  B/P controlled, and cough improved after stopping olmesartan.  BMP reviewed - no significant abnormalities - continue current

## 2024-09-16 DIAGNOSIS — I48.20 CHRONIC ATRIAL FIBRILLATION, UNSPECIFIED (HCC): Primary | ICD-10-CM

## 2024-09-17 RX ORDER — FUROSEMIDE 40 MG
20 TABLET ORAL DAILY
Qty: 90 TABLET | Refills: 3 | Status: SHIPPED | OUTPATIENT
Start: 2024-09-17

## 2024-09-29 DIAGNOSIS — I48.20 CHRONIC ATRIAL FIBRILLATION, UNSPECIFIED (HCC): ICD-10-CM

## 2024-10-01 RX ORDER — RIVAROXABAN 20 MG/1
20 TABLET, FILM COATED ORAL DAILY
Qty: 90 TABLET | Refills: 3 | Status: SHIPPED | OUTPATIENT
Start: 2024-10-01

## 2024-10-03 DIAGNOSIS — I10 ESSENTIAL (PRIMARY) HYPERTENSION: ICD-10-CM

## 2024-10-03 RX ORDER — HYDRALAZINE HYDROCHLORIDE 50 MG/1
50 TABLET, FILM COATED ORAL 3 TIMES DAILY
Qty: 270 TABLET | Refills: 3 | Status: SHIPPED | OUTPATIENT
Start: 2024-10-03

## 2025-01-09 ENCOUNTER — OFFICE VISIT (OUTPATIENT)
Age: 89
End: 2025-01-09

## 2025-01-09 VITALS
DIASTOLIC BLOOD PRESSURE: 63 MMHG | WEIGHT: 134 LBS | HEIGHT: 60 IN | BODY MASS INDEX: 26.31 KG/M2 | HEART RATE: 71 BPM | OXYGEN SATURATION: 96 % | SYSTOLIC BLOOD PRESSURE: 154 MMHG

## 2025-01-09 DIAGNOSIS — Z09 HOSPITAL DISCHARGE FOLLOW-UP: Primary | ICD-10-CM

## 2025-01-09 DIAGNOSIS — Z79.01 LONG TERM (CURRENT) USE OF ANTICOAGULANTS: ICD-10-CM

## 2025-01-09 DIAGNOSIS — I10 ESSENTIAL (PRIMARY) HYPERTENSION: ICD-10-CM

## 2025-01-09 DIAGNOSIS — I50.32 CHRONIC DIASTOLIC (CONGESTIVE) HEART FAILURE (HCC): ICD-10-CM

## 2025-01-09 DIAGNOSIS — I08.0 MITRAL AND AORTIC REGURGITATION: ICD-10-CM

## 2025-01-09 DIAGNOSIS — I48.20 CHRONIC ATRIAL FIBRILLATION, UNSPECIFIED (HCC): ICD-10-CM

## 2025-01-09 RX ORDER — FUROSEMIDE 40 MG/1
40 TABLET ORAL DAILY
Qty: 135 TABLET | Refills: 1
Start: 2025-01-09

## 2025-01-09 RX ORDER — IPRATROPIUM BROMIDE AND ALBUTEROL SULFATE 2.5; .5 MG/3ML; MG/3ML
1 SOLUTION RESPIRATORY (INHALATION) EVERY 4 HOURS
COMMUNITY

## 2025-01-09 ASSESSMENT — PATIENT HEALTH QUESTIONNAIRE - PHQ9
SUM OF ALL RESPONSES TO PHQ QUESTIONS 1-9: 0
1. LITTLE INTEREST OR PLEASURE IN DOING THINGS: NOT AT ALL
SUM OF ALL RESPONSES TO PHQ QUESTIONS 1-9: 0
SUM OF ALL RESPONSES TO PHQ9 QUESTIONS 1 & 2: 0
2. FEELING DOWN, DEPRESSED OR HOPELESS: NOT AT ALL

## 2025-01-09 NOTE — PROGRESS NOTES
1. Have you been to the ER, urgent care clinic since your last visit?  Hospitalized since your last visit?     Yes [facility] for swelling/feet and legs and fluid around heart    2. Have you seen or consulted any other health care providers outside of the [facility] since your last visit?  Include any pap smears or colon screening.       Yes pcp  
HYSTERECTOMY (CERVIX STATUS UNKNOWN)      SPLENECTOMY         Social History     Tobacco Use    Smoking status: Never    Smokeless tobacco: Never   Substance Use Topics    Alcohol use: Yes     Comment: social       Allergies   Allergen Reactions    Lisinopril Cough     *Antihypertensives *    Codeine Other (See Comments)     Severe n/v    Piperacillin Sod-Tazobactam So Itching       Prior to Admission medications    Medication Sig Start Date End Date Taking? Authorizing Provider   ipratropium 0.5 mg-albuterol 2.5 mg (DUONEB) 0.5-2.5 (3) MG/3ML SOLN nebulizer solution Inhale 3 mLs into the lungs every 4 hours   Yes Provider, MD Rosas   apixaban (ELIQUIS) 2.5 MG TABS tablet Take 1 tablet by mouth 2 times daily   Yes Rosas Leary MD   furosemide (LASIX) 40 MG tablet Take 1 tablet by mouth daily Take lasix 40 mg in AM and 20 mg in PM 1/9/25  Yes Maria Eugenia Pérez APRN - NP   hydrALAZINE (APRESOLINE) 50 MG tablet Take 1 tablet by mouth 3 times daily 10/3/24  Yes Jac Stinson MD   meclizine (ANTIVERT) 25 MG tablet Take 1 tablet by mouth 3 times daily as needed for Dizziness 7/11/24  Yes Maria Eugenia Pérez APRN - NP   TRELEGY ELLIPTA 100-62.5-25 MCG/ACT AEPB inhaler  9/19/23  Yes ProviderRosas MD   albuterol sulfate HFA (PROVENTIL;VENTOLIN;PROAIR) 108 (90 Base) MCG/ACT inhaler Inhale 2 puffs into the lungs 3 times daily as needed   Yes Automatic Reconciliation, Ar   atorvastatin (LIPITOR) 20 MG tablet TAKE ONE TABLET BY MOUTH DAILY 10/29/20  Yes Automatic Reconciliation, Ar   dilTIAZem (TIAZAC) 240 MG extended release capsule Take 1 capsule by mouth daily 4/19/22  Yes Automatic Reconciliation, Ar         BP (!) 154/63 (Site: Right Upper Arm, Position: Sitting, Cuff Size: Medium Adult)   Pulse 71   Ht 1.524 m (5')   Wt 60.8 kg (134 lb)   SpO2 96%   BMI 26.17 kg/m²     Physical Exam  Constitutional:       Appearance: She is well-developed.   HENT:      Head: Normocephalic and atraumatic.   Eyes:

## 2025-05-15 ENCOUNTER — OFFICE VISIT (OUTPATIENT)
Age: 89
End: 2025-05-15
Payer: MEDICARE

## 2025-05-15 VITALS
HEART RATE: 62 BPM | BODY MASS INDEX: 25.52 KG/M2 | OXYGEN SATURATION: 97 % | WEIGHT: 130 LBS | SYSTOLIC BLOOD PRESSURE: 142 MMHG | DIASTOLIC BLOOD PRESSURE: 60 MMHG | HEIGHT: 60 IN

## 2025-05-15 DIAGNOSIS — I10 ESSENTIAL (PRIMARY) HYPERTENSION: ICD-10-CM

## 2025-05-15 DIAGNOSIS — I08.0 MITRAL AND AORTIC REGURGITATION: ICD-10-CM

## 2025-05-15 DIAGNOSIS — Z79.01 LONG TERM (CURRENT) USE OF ANTICOAGULANTS: ICD-10-CM

## 2025-05-15 DIAGNOSIS — I48.20 CHRONIC ATRIAL FIBRILLATION, UNSPECIFIED (HCC): ICD-10-CM

## 2025-05-15 DIAGNOSIS — I50.32 CHRONIC DIASTOLIC (CONGESTIVE) HEART FAILURE (HCC): Primary | ICD-10-CM

## 2025-05-15 DIAGNOSIS — I27.20 PULMONARY HYPERTENSION (HCC): ICD-10-CM

## 2025-05-15 PROCEDURE — 1160F RVW MEDS BY RX/DR IN RCRD: CPT | Performed by: NURSE PRACTITIONER

## 2025-05-15 PROCEDURE — 1125F AMNT PAIN NOTED PAIN PRSNT: CPT | Performed by: NURSE PRACTITIONER

## 2025-05-15 PROCEDURE — 1159F MED LIST DOCD IN RCRD: CPT | Performed by: NURSE PRACTITIONER

## 2025-05-15 PROCEDURE — 99214 OFFICE O/P EST MOD 30 MIN: CPT | Performed by: NURSE PRACTITIONER

## 2025-05-15 PROCEDURE — 1123F ACP DISCUSS/DSCN MKR DOCD: CPT | Performed by: NURSE PRACTITIONER

## 2025-05-15 RX ORDER — BUDESONIDE 0.25 MG/2ML
1 INHALANT ORAL
COMMUNITY

## 2025-05-15 RX ORDER — FORMOTEROL FUMARATE 20 UG/2ML
20 SOLUTION RESPIRATORY (INHALATION)
COMMUNITY

## 2025-05-15 ASSESSMENT — PATIENT HEALTH QUESTIONNAIRE - PHQ9
1. LITTLE INTEREST OR PLEASURE IN DOING THINGS: NOT AT ALL
SUM OF ALL RESPONSES TO PHQ QUESTIONS 1-9: 0
2. FEELING DOWN, DEPRESSED OR HOPELESS: NOT AT ALL

## 2025-05-15 NOTE — PROGRESS NOTES
1. Have you been to the ER, urgent care clinic since your last visit?  Hospitalized since your last visit?     No    2. Have you seen or consulted any other health care providers outside of the [facility] since your last visit?  Include any pap smears or colon screening.      No       
significantly better rate is better controlled.  Blood pressure stable.  Continue current treatment  2/2023  Cardiac status remains stable continue current medical management monitor  8/2023  Cardiac status stable.  Rate controlled continue treatment tolerating anticoagulation  Hacking cough for few months.  Possibly from olmesartan patient had cough with lisinopril in past.  We will discontinue.  Monitor blood pressure.  Change to hydralazine.  9/2023  Cardiac status is stable.  In office EKG Afib with slow RVR.  B/P controlled, and cough improved after stopping olmesartan.  BMP reviewed - no significant abnormalities - continue current medications.  11/2023  Seen earlier with increased swelling.  Olmesartan HCTZ was discontinued due to cough.  Cough is improved.  Swelling is increased.  Will start Lasix 40 mg a day.  Blood pressure mildly elevated monitor.  Follow-up echo  12/2023  Seen in f/u for HTN - b/p mildly elevated in office today. Edema has improved with lasix 20 mg / day, with weight loss of 7 pounds.  Echo reviewed and discussed with patient, normal LV function, mild AI, mild Pulmonary hypertension. Will increase hydralazine to 50 mg TID and follow home b/p chart. Will refill Meclizine for vertigo until able to be seen by PCP.  7/1//2024  Blood pressure mildly elevated in office today Home blood pressure readings within normal limits.  Advised to stagger diltiazem and  hydralazine due to complaint of dizziness.  May take meclizine as needed edema has resolved will decrease Lasix to 20 mg/day may take increased dose if needed if edema returns  1/9/2025  Seen following recent Acute HFpEF exacerbation.  Edema has improved with increased diuretics.  Has BLE 1+ - will continue lasix 40 mg in AM and add 20 mg in PM  Prior echo 12/23 EF 55-60% indeterminate diastolic function  Recommend compression stockings daily.  Elevate feet as much as possible, with low sodium diet.  Consider repeat echo for new or

## 2025-07-02 DIAGNOSIS — I48.20 CHRONIC ATRIAL FIBRILLATION, UNSPECIFIED (HCC): ICD-10-CM

## 2025-07-02 RX ORDER — FUROSEMIDE 40 MG/1
TABLET ORAL
Qty: 180 TABLET | Refills: 2 | Status: SHIPPED | OUTPATIENT
Start: 2025-07-02